# Patient Record
Sex: FEMALE | Race: WHITE | NOT HISPANIC OR LATINO | Employment: FULL TIME | URBAN - METROPOLITAN AREA
[De-identification: names, ages, dates, MRNs, and addresses within clinical notes are randomized per-mention and may not be internally consistent; named-entity substitution may affect disease eponyms.]

---

## 2018-03-08 ENCOUNTER — OFFICE VISIT CONVERTED (OUTPATIENT)
Dept: FAMILY MEDICINE CLINIC | Facility: CLINIC | Age: 58
End: 2018-03-08
Attending: NURSE PRACTITIONER

## 2018-04-02 ENCOUNTER — CONVERSION ENCOUNTER (OUTPATIENT)
Dept: GENERAL RADIOLOGY | Facility: HOSPITAL | Age: 58
End: 2018-04-02

## 2021-05-16 VITALS
HEART RATE: 101 BPM | TEMPERATURE: 97.7 F | HEIGHT: 64 IN | WEIGHT: 178 LBS | DIASTOLIC BLOOD PRESSURE: 74 MMHG | BODY MASS INDEX: 30.39 KG/M2 | SYSTOLIC BLOOD PRESSURE: 132 MMHG

## 2022-10-11 ENCOUNTER — OFFICE VISIT (OUTPATIENT)
Dept: ORTHOPEDIC SURGERY | Facility: CLINIC | Age: 62
End: 2022-10-11

## 2022-10-11 VITALS — TEMPERATURE: 97.4 F | WEIGHT: 177 LBS | HEIGHT: 64 IN | BODY MASS INDEX: 30.22 KG/M2

## 2022-10-11 DIAGNOSIS — M25.552 LEFT HIP PAIN: ICD-10-CM

## 2022-10-11 DIAGNOSIS — M25.551 RIGHT HIP PAIN: ICD-10-CM

## 2022-10-11 DIAGNOSIS — R52 PAIN: Primary | ICD-10-CM

## 2022-10-11 PROCEDURE — 99203 OFFICE O/P NEW LOW 30 MIN: CPT | Performed by: NURSE PRACTITIONER

## 2022-10-11 PROCEDURE — 73522 X-RAY EXAM HIPS BI 3-4 VIEWS: CPT | Performed by: NURSE PRACTITIONER

## 2022-10-11 NOTE — PROGRESS NOTES
"Patient: Carole Mckeon  YOB: 1960 62 y.o. female  Medical Record Number: [id number]    Chief Complaints:   Chief Complaint   Patient presents with   • Left Hip - Initial Evaluation   • Right Hip - Initial Evaluation       History of Present Illness:Carole Mckeon is a 62 y.o. female who presents with complaints of intermittent bilateral hip pain.  Patient had right total hip replacement by Dr. Mcdaniels in 2002 subsequent left total hip replacement by Dr. Moore in 2008.  She has done very well through the last few years but has had some intermittent bilateral hip pain over the last few months.  Denies any injury.  She does have some intermittent low back pain but denies any radicular symptoms.  Patient denies any injury    Allergies: No Known Allergies    Medications:   No current outpatient medications on file.     No current facility-administered medications for this visit.         The following portions of the patient's history were reviewed and updated as appropriate: allergies, current medications, past family history, past medical history, past social history, past surgical history and problem list.    Review of Systems:   A 14 point review of systems was performed. All systems negative except pertinent positives/negative listed in HPI above    Physical Exam:   Vitals:    10/11/22 1513   Temp: 97.4 °F (36.3 °C)   TempSrc: Temporal   Weight: 80.3 kg (177 lb)   Height: 162.6 cm (64.02\")       General: A and O x 3, ASA, NAD    SCLERA:    Normal    Skin clear no unusual lesions noted  Bilateral hips patient is slightly tender over bilateral hip greater trochanteric bursa otherwise has excellent range of motion with no instability calf is soft and nontender       Radiology:  Xrays 2 views bilateral hips were ordered and reviewed today secondary to pain show well-placed well-positioned bilateral total hip replacements, she does have some mild polyethylene wear right hip.  No comparative views " available    Assessment/Plan: Status post bilateral CODEY with intermittent bilateral lateral hip pain    The patient and I discussed options, at this point the soreness is just intermittent and she just wanted to make sure everything was okay with regards to the hip replacement.  Would recommend Tylenol as needed, if her symptoms were to worsen we can always try bilateral hip bursa injections, I did offer work-up for her lower back since she also has some lower back pain but the patient declines at this time.  She will follow-up with Dr. Moore in about a year for her hips but will call in the meantime if her symptoms worsen      Vandana Linares, APRN  10/11/2022

## 2022-10-12 ENCOUNTER — PATIENT ROUNDING (BHMG ONLY) (OUTPATIENT)
Dept: ORTHOPEDIC SURGERY | Facility: CLINIC | Age: 62
End: 2022-10-12

## 2022-10-12 NOTE — PROGRESS NOTES
A [employer] Message has been sent to the patient for PATIENT ROUNDING with [facility]

## 2022-11-11 ENCOUNTER — OFFICE VISIT (OUTPATIENT)
Dept: ORTHOPEDIC SURGERY | Facility: CLINIC | Age: 62
End: 2022-11-11

## 2022-11-11 VITALS — BODY MASS INDEX: 30.22 KG/M2 | TEMPERATURE: 97.6 F | HEIGHT: 64 IN | WEIGHT: 177 LBS

## 2022-11-11 DIAGNOSIS — R52 PAIN: Primary | ICD-10-CM

## 2022-11-11 DIAGNOSIS — M19.019 SHOULDER ARTHRITIS: ICD-10-CM

## 2022-11-11 PROCEDURE — 73030 X-RAY EXAM OF SHOULDER: CPT | Performed by: ORTHOPAEDIC SURGERY

## 2022-11-11 PROCEDURE — 99213 OFFICE O/P EST LOW 20 MIN: CPT | Performed by: ORTHOPAEDIC SURGERY

## 2022-11-11 PROCEDURE — 20610 DRAIN/INJ JOINT/BURSA W/O US: CPT | Performed by: ORTHOPAEDIC SURGERY

## 2022-11-11 RX ORDER — METHYLPREDNISOLONE ACETATE 80 MG/ML
80 INJECTION, SUSPENSION INTRA-ARTICULAR; INTRALESIONAL; INTRAMUSCULAR; SOFT TISSUE
Status: COMPLETED | OUTPATIENT
Start: 2022-11-11 | End: 2022-11-11

## 2022-11-11 RX ADMIN — METHYLPREDNISOLONE ACETATE 80 MG: 80 INJECTION, SUSPENSION INTRA-ARTICULAR; INTRALESIONAL; INTRAMUSCULAR; SOFT TISSUE at 14:21

## 2022-11-11 NOTE — PROGRESS NOTES
Patient: Carole Mckeon    YOB: 1960    Medical Record Number: [id number]    Chief Complaints:  Left shoulder pain    History of Present Illness:     62 y.o. female patient who presents with a complaint of left shoulder pain.  She reports that the symptoms first started a few months ago.  She does not recall any specific inciting event or factor.  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  She says she has to do a lot of lifting with her left shoulder and she thinks that may have contributed in someway.  Her current pain is moderate, constant and aching.  She denies any alleviating factors although she has not yet had any treatment.  She denies any shooting pain down the arm, weakness, numbness or paresthesias.     Allergies: No Known Allergies    Home Medications:  No current outpatient medications on file.    Past Medical History:   Diagnosis Date   • Arthritis of back Oct 22    On X-ray at last visit   • Knee swelling    • Low back strain Nov 1       Past Surgical History:   Procedure Laterality Date   • HIP SURGERY      B THR   • JOINT REPLACEMENT      2002 and 2008       Social History     Occupational History   • Not on file   Tobacco Use   • Smoking status: Never   • Smokeless tobacco: Not on file   Substance and Sexual Activity   • Alcohol use: Never   • Drug use: Never   • Sexual activity: Never      Social History     Social History Narrative   • Not on file       Family History   Problem Relation Age of Onset   • Cancer Mother         Pancreatic   • Osteoporosis Mother        Review of Systems:      Constitutional: Denies fever, shaking or chills   Eyes: Denies change in visual acuity   HEENT: Denies nasal congestion or sore throat   Respiratory: Denies cough or shortness of breath   Cardiovascular: Denies chest pain or edema  Endocrine: Denies tremors, palpitations, intolerance of heat or cold, polyuria, polydipsia.  GI: Denies abdominal pain, nausea, vomiting, bloody stools or  "diarrhea  : Denies frequency, urgency, incontinence, retention, or nocturia.  Musculoskeletal: Denies numbness, tingling or loss of motor function   Integument: Denies rash, lesion or ulceration   Neurologic: Denies headache or focal weakness, deficits  Heme: Denies spontaneous or excessive bleeding, epistaxis, hematuria, melena, fatigue, enlarged or tender lymph nodes.      All other pertinent positives and negatives as noted above in HPI.    Physical Exam:   62 y.o. female  Vitals:    11/11/22 1356   Temp: 97.6 °F (36.4 °C)   Weight: 80.3 kg (177 lb)   Height: 162.6 cm (64\")       General:  Patient is awake and alert.  Appears in no acute distress or discomfort.    Psych:  Affect and demeanor are appropriate.    Cardiovascular:  Regular rate and rhythm.    Lungs:  Good chest expansion.  Breathing unlabored.    Spine:  Neck appears grossly normal.  No palpable masses or adenopathy.  Good motion.  Spurling's maneuver is negative for any shoulder or arm symptoms.    Extremities:  Left shoulder is examined.  Skin is benign.  No obvious gross abnormalities.  No palpable masses or adenopathy.  Moderate tenderness noted over anterior glenohumeral joint and rotator interval.  Motion is to 165 of forward elevation, 60 external rotation, full horizontal abduction, T10 internal rotation.  No instability.  Rotator cuff strength seems to be well preserved but the exam is limited due to discomfort with resisted active motion.  Good motor and sensory function in her lower arm and hand.  Palpable radial pulse.  Brisk capillary refill.         Radiology:  AP, scapular Y, and axillary views of the left shoulder are ordered by myself and reviewed to evaluate the patient's complaint.  No comparison films are immediately available.  The x-rays show mild glenohumeral osteoarthritis with joint space narrowing, osteophyte formation, and subchondral sclerosis.  The acromiohumeral interval measures normal.    Assessment/Plan:  Left " shoulder osteoarthritis    Her motion and function are still really good.  Her x-rays show mild arthritis but it is not profound.  I recommend we try to manage this conservatively. Regarding conservative treatment, we discussed appropriate activity modifications, anti-inflammatories, injections, and physical therapy.  She acknowledged understanding of the information and elected for an injection.  The risk, benefits and alternatives were discussed.  She consented and the injection was performed as described below.  Going forward, she will follow-up with me on an as-needed basis.    Baldo Barros MD    11/11/2022    CC to Chichi Caballero APRN    Large Joint Arthrocentesis: L glenohumeral  Date/Time: 11/11/2022 2:21 PM  Consent given by: patient  Site marked: site marked  Timeout: Immediately prior to procedure a time out was called to verify the correct patient, procedure, equipment, support staff and site/side marked as required   Supporting Documentation  Indications: pain   Procedure Details  Location: shoulder - L glenohumeral  Preparation: Patient was prepped and draped in the usual sterile fashion  Needle gauge: 21G.  Approach: posterior  Medications administered: 80 mg methylPREDNISolone acetate 80 MG/ML; 2 mL lidocaine (cardiac)  Patient tolerance: patient tolerated the procedure well with no immediate complications

## 2022-12-06 ENCOUNTER — TRANSCRIBE ORDERS (OUTPATIENT)
Dept: ADMINISTRATIVE | Facility: HOSPITAL | Age: 62
End: 2022-12-06

## 2022-12-06 ENCOUNTER — HOSPITAL ENCOUNTER (OUTPATIENT)
Dept: INFUSION THERAPY | Facility: HOSPITAL | Age: 62
Discharge: HOME | End: 2022-12-06
Attending: INTERNAL MEDICINE | Admitting: INTERNAL MEDICINE

## 2022-12-06 VITALS
TEMPERATURE: 99.1 F | DIASTOLIC BLOOD PRESSURE: 73 MMHG | OXYGEN SATURATION: 100 % | SYSTOLIC BLOOD PRESSURE: 148 MMHG | RESPIRATION RATE: 18 BRPM | HEART RATE: 86 BPM

## 2022-12-06 DIAGNOSIS — D64.9 ANEMIA, UNSPECIFIED TYPE: Primary | ICD-10-CM

## 2022-12-06 DIAGNOSIS — Z12.31 VISIT FOR SCREENING MAMMOGRAM: Primary | ICD-10-CM

## 2022-12-06 LAB
ABO GROUP BLD: NORMAL
ABO GROUP BLD: NORMAL
BLD GP AB SCN SERPL QL: NEGATIVE
HCT VFR BLD AUTO: 22.4 % (ref 34–46.6)
HGB BLD-MCNC: 6.3 G/DL (ref 12–15.9)
RH BLD: POSITIVE
RH BLD: POSITIVE
T&S EXPIRATION DATE: NORMAL

## 2022-12-06 PROCEDURE — 36430 TRANSFUSION BLD/BLD COMPNT: CPT

## 2022-12-06 PROCEDURE — 86901 BLOOD TYPING SEROLOGIC RH(D): CPT

## 2022-12-06 PROCEDURE — 85018 HEMOGLOBIN: CPT | Performed by: INTERNAL MEDICINE

## 2022-12-06 PROCEDURE — 86900 BLOOD TYPING SEROLOGIC ABO: CPT

## 2022-12-06 PROCEDURE — 86923 COMPATIBILITY TEST ELECTRIC: CPT

## 2022-12-06 PROCEDURE — 86850 RBC ANTIBODY SCREEN: CPT | Performed by: INTERNAL MEDICINE

## 2022-12-06 PROCEDURE — 86901 BLOOD TYPING SEROLOGIC RH(D): CPT | Performed by: INTERNAL MEDICINE

## 2022-12-06 PROCEDURE — 85014 HEMATOCRIT: CPT | Performed by: INTERNAL MEDICINE

## 2022-12-06 PROCEDURE — 86900 BLOOD TYPING SEROLOGIC ABO: CPT | Performed by: INTERNAL MEDICINE

## 2022-12-06 PROCEDURE — P9016 RBC LEUKOCYTES REDUCED: HCPCS

## 2022-12-06 NOTE — NURSING NOTE
AAO. Patient resting comfortably. Second unit PRBC infusing without difficulty. No needs expressed by patient at this time.

## 2022-12-07 ENCOUNTER — TELEPHONE (OUTPATIENT)
Dept: INTERNAL MEDICINE | Facility: CLINIC | Age: 62
End: 2022-12-07

## 2022-12-08 LAB
BH BB BLOOD EXPIRATION DATE: NORMAL
BH BB BLOOD EXPIRATION DATE: NORMAL
BH BB BLOOD TYPE BARCODE: 6200
BH BB BLOOD TYPE BARCODE: 6200
BH BB DISPENSE STATUS: NORMAL
BH BB DISPENSE STATUS: NORMAL
BH BB PRODUCT CODE: NORMAL
BH BB PRODUCT CODE: NORMAL
BH BB UNIT NUMBER: NORMAL
BH BB UNIT NUMBER: NORMAL
CROSSMATCH INTERPRETATION: NORMAL
CROSSMATCH INTERPRETATION: NORMAL
UNIT  ABO: NORMAL
UNIT  ABO: NORMAL
UNIT  RH: NORMAL
UNIT  RH: NORMAL

## 2022-12-15 ENCOUNTER — TELEPHONE (OUTPATIENT)
Dept: GASTROENTEROLOGY | Facility: CLINIC | Age: 62
End: 2022-12-15

## 2022-12-20 ENCOUNTER — TELEPHONE (OUTPATIENT)
Dept: GASTROENTEROLOGY | Facility: CLINIC | Age: 62
End: 2022-12-20

## 2022-12-20 ENCOUNTER — OFFICE VISIT (OUTPATIENT)
Dept: GASTROENTEROLOGY | Facility: CLINIC | Age: 62
End: 2022-12-20

## 2022-12-20 VITALS
SYSTOLIC BLOOD PRESSURE: 142 MMHG | OXYGEN SATURATION: 96 % | TEMPERATURE: 97.9 F | BODY MASS INDEX: 30.73 KG/M2 | HEIGHT: 64 IN | WEIGHT: 180 LBS | DIASTOLIC BLOOD PRESSURE: 79 MMHG | HEART RATE: 98 BPM

## 2022-12-20 DIAGNOSIS — D50.9 IRON DEFICIENCY ANEMIA, UNSPECIFIED IRON DEFICIENCY ANEMIA TYPE: Primary | ICD-10-CM

## 2022-12-20 PROCEDURE — 99204 OFFICE O/P NEW MOD 45 MIN: CPT | Performed by: INTERNAL MEDICINE

## 2022-12-20 RX ORDER — MULTIPLE VITAMINS W/ MINERALS TAB 9MG-400MCG
1 TAB ORAL DAILY
COMMUNITY

## 2022-12-20 NOTE — H&P (VIEW-ONLY)
Chief Complaint   Patient presents with   • Anemia     Carole Mckeon is a 62 y.o. female who presents with a history of iron deficiency anemia  HPI     Patient 62-year-old female with history of hiatal hernia and GERD as well as osteoarthritis presenting with tachycardia seen by primary care noted with marked anemia to hemoglobin 6.3.  Patient's labs also indicated us decreased MCV low iron saturation as well as low ferritin all consistent with iron deficiency.  Patient denies any bright red blood per rectum or melena no hematemesis no change in diet or appetite no change in bowel habits.  Patient referred with only moderate improvement in the tachycardia still symptomatic.  Patient did receive 2 units of blood but no follow-up CBC performed.    Past Medical History:   Diagnosis Date   • Arthritis of back Oct 22    On X-ray at last visit   • GERD (gastroesophageal reflux disease)    • Hernia     Hiatal   • Knee swelling    • Low back strain Nov 1       Current Outpatient Medications:   •  multivitamin with minerals (MULTIVITAMIN WOMEN PO), Take 1 tablet by mouth Daily., Disp: , Rfl:   No Known Allergies  Social History     Socioeconomic History   • Marital status:    Tobacco Use   • Smoking status: Never   Substance and Sexual Activity   • Alcohol use: Never   • Drug use: Never   • Sexual activity: Never     Family History   Problem Relation Age of Onset   • Cancer Mother         Pancreatic   • Osteoporosis Mother    • Pancreatic cancer Mother    • Colon polyps Father    • Colon cancer Maternal Aunt      Review of Systems   Constitutional: Negative.    HENT: Negative.    Eyes: Negative.    Respiratory: Negative.    Cardiovascular: Positive for palpitations. Negative for chest pain and leg swelling.   Gastrointestinal: Negative.    Endocrine: Negative.    Musculoskeletal: Negative.    Skin: Negative.    Allergic/Immunologic: Negative.    Hematological: Negative.      Vitals:    12/20/22 1419   BP: 142/79    Pulse: 98   Temp: 97.9 °F (36.6 °C)   SpO2: 96%     Physical Exam  Vitals and nursing note reviewed.   Constitutional:       Appearance: Normal appearance. She is well-developed.   HENT:      Head: Normocephalic and atraumatic.   Eyes:      General: No scleral icterus.     Pupils: Pupils are equal, round, and reactive to light.   Cardiovascular:      Rate and Rhythm: Normal rate and regular rhythm.      Heart sounds: Normal heart sounds. No murmur heard.    No friction rub. No gallop.   Pulmonary:      Effort: Pulmonary effort is normal.      Breath sounds: Normal breath sounds. No wheezing or rales.   Abdominal:      General: Bowel sounds are normal. There is no distension or abdominal bruit.      Palpations: Abdomen is soft. Abdomen is not rigid. There is no shifting dullness, fluid wave, mass or pulsatile mass.      Tenderness: There is no abdominal tenderness. There is no guarding.      Hernia: No hernia is present.   Musculoskeletal:         General: No swelling or tenderness. Normal range of motion.      Cervical back: Normal range of motion and neck supple. No rigidity.   Skin:     General: Skin is warm and dry.      Coloration: Skin is not jaundiced.      Findings: No rash.   Neurological:      General: No focal deficit present.      Mental Status: She is alert and oriented to person, place, and time.      Cranial Nerves: No cranial nerve deficit.   Psychiatric:         Behavior: Behavior normal.         Thought Content: Thought content normal.       Diagnoses and all orders for this visit:    Iron deficiency anemia, unspecified iron deficiency anemia type  -     Case Request; Standing  -     Implement Anesthesia orders day of procedure.; Standing  -     Obtain informed consent; Standing  -     Case Request    Other orders  -     multivitamin with minerals (MULTIVITAMIN WOMEN PO); Take 1 tablet by mouth Daily.    Patient 62-year-old female with history of osteoarthritis, GERD with a hiatal hernia  presenting with iron deficiency anemia.  Patient presented with symptoms of tachycardia evaluation revealed a hemoglobin of 6.3 with 66 MCV and very low iron saturation and ferritin consistent with iron deficiency.  Patient denies change in bowel habits no change in diet or appetite no bright red blood per rectum or melena denies any nausea or vomiting.  Patient last colonoscopy 2015.  Scope reportedly negative.  Patient with EGD at the same time showing a hiatal hernia.  At this point we will recheck CBC after 2 units transfused to make sure is close enough to safely perform EGD colonoscopy and arrange colonoscopy and EGD this year for source of possible GI blood loss.  We will follow-up clinically based on findings endoscopically.

## 2022-12-20 NOTE — TELEPHONE ENCOUNTER
MITZI patient via telephone for EGD/COLONOSCOPY. Scheduled 12/30/2022 with arrival time of 1215PM. Prep paperwork mailed to verified address on file. Patient advised arrival time may change based on [facility] guidelines. MITZI MADDEN

## 2022-12-21 LAB
ERYTHROCYTE [DISTWIDTH] IN BLOOD BY AUTOMATED COUNT: 20.3 % (ref 12.3–15.4)
HCT VFR BLD AUTO: 30.9 % (ref 34–46.6)
HGB BLD-MCNC: 9.1 G/DL (ref 12–15.9)
MCH RBC QN AUTO: 21.7 PG (ref 26.6–33)
MCHC RBC AUTO-ENTMCNC: 29.4 G/DL (ref 31.5–35.7)
MCV RBC AUTO: 73.7 FL (ref 79–97)
PLATELET # BLD AUTO: 376 10*3/MM3 (ref 140–450)
RBC # BLD AUTO: 4.19 10*6/MM3 (ref 3.77–5.28)
WBC # BLD AUTO: 5.79 10*3/MM3 (ref 3.4–10.8)

## 2022-12-30 ENCOUNTER — ANESTHESIA EVENT (OUTPATIENT)
Dept: GASTROENTEROLOGY | Facility: HOSPITAL | Age: 62
End: 2022-12-30
Payer: COMMERCIAL

## 2022-12-30 ENCOUNTER — ANESTHESIA (OUTPATIENT)
Dept: GASTROENTEROLOGY | Facility: HOSPITAL | Age: 62
End: 2022-12-30
Payer: COMMERCIAL

## 2022-12-30 ENCOUNTER — HOSPITAL ENCOUNTER (OUTPATIENT)
Facility: HOSPITAL | Age: 62
Setting detail: HOSPITAL OUTPATIENT SURGERY
Discharge: HOME | End: 2022-12-30
Attending: INTERNAL MEDICINE | Admitting: INTERNAL MEDICINE
Payer: COMMERCIAL

## 2022-12-30 VITALS
SYSTOLIC BLOOD PRESSURE: 142 MMHG | RESPIRATION RATE: 16 BRPM | DIASTOLIC BLOOD PRESSURE: 76 MMHG | HEART RATE: 78 BPM | TEMPERATURE: 97.8 F | OXYGEN SATURATION: 97 % | BODY MASS INDEX: 29.43 KG/M2 | HEIGHT: 64 IN | WEIGHT: 172.4 LBS

## 2022-12-30 DIAGNOSIS — D50.9 IRON DEFICIENCY ANEMIA, UNSPECIFIED IRON DEFICIENCY ANEMIA TYPE: ICD-10-CM

## 2022-12-30 PROCEDURE — 45378 DIAGNOSTIC COLONOSCOPY: CPT | Performed by: INTERNAL MEDICINE

## 2022-12-30 PROCEDURE — 43239 EGD BIOPSY SINGLE/MULTIPLE: CPT | Performed by: INTERNAL MEDICINE

## 2022-12-30 PROCEDURE — 88305 TISSUE EXAM BY PATHOLOGIST: CPT | Performed by: INTERNAL MEDICINE

## 2022-12-30 PROCEDURE — 25010000002 PROPOFOL 10 MG/ML EMULSION: Performed by: ANESTHESIOLOGY

## 2022-12-30 RX ORDER — SODIUM CHLORIDE 0.9 % (FLUSH) 0.9 %
10 SYRINGE (ML) INJECTION AS NEEDED
Status: DISCONTINUED | OUTPATIENT
Start: 2022-12-30 | End: 2022-12-30 | Stop reason: HOSPADM

## 2022-12-30 RX ORDER — PROPOFOL 10 MG/ML
VIAL (ML) INTRAVENOUS AS NEEDED
Status: DISCONTINUED | OUTPATIENT
Start: 2022-12-30 | End: 2022-12-30 | Stop reason: SURG

## 2022-12-30 RX ORDER — SODIUM CHLORIDE 9 MG/ML
40 INJECTION, SOLUTION INTRAVENOUS AS NEEDED
Status: DISCONTINUED | OUTPATIENT
Start: 2022-12-30 | End: 2022-12-30 | Stop reason: HOSPADM

## 2022-12-30 RX ORDER — SODIUM CHLORIDE 0.9 % (FLUSH) 0.9 %
10 SYRINGE (ML) INJECTION EVERY 12 HOURS SCHEDULED
Status: DISCONTINUED | OUTPATIENT
Start: 2022-12-30 | End: 2022-12-30 | Stop reason: HOSPADM

## 2022-12-30 RX ORDER — SODIUM CHLORIDE, SODIUM LACTATE, POTASSIUM CHLORIDE, CALCIUM CHLORIDE 600; 310; 30; 20 MG/100ML; MG/100ML; MG/100ML; MG/100ML
30 INJECTION, SOLUTION INTRAVENOUS CONTINUOUS PRN
Status: DISCONTINUED | OUTPATIENT
Start: 2022-12-30 | End: 2022-12-30 | Stop reason: HOSPADM

## 2022-12-30 RX ORDER — PANTOPRAZOLE SODIUM 40 MG/1
40 TABLET, DELAYED RELEASE ORAL DAILY
Qty: 90 TABLET | Refills: 3 | Status: SHIPPED | OUTPATIENT
Start: 2022-12-30

## 2022-12-30 RX ORDER — LIDOCAINE HYDROCHLORIDE 20 MG/ML
INJECTION, SOLUTION INFILTRATION; PERINEURAL AS NEEDED
Status: DISCONTINUED | OUTPATIENT
Start: 2022-12-30 | End: 2022-12-30 | Stop reason: SURG

## 2022-12-30 RX ORDER — PANTOPRAZOLE SODIUM 40 MG/1
40 TABLET, DELAYED RELEASE ORAL DAILY
Qty: 90 TABLET | Refills: 3 | Status: SHIPPED | OUTPATIENT
Start: 2022-12-30 | End: 2022-12-30 | Stop reason: SDUPTHER

## 2022-12-30 RX ADMIN — PROPOFOL 200 MCG/KG/MIN: 10 INJECTION, EMULSION INTRAVENOUS at 14:17

## 2022-12-30 RX ADMIN — SODIUM CHLORIDE, POTASSIUM CHLORIDE, SODIUM LACTATE AND CALCIUM CHLORIDE 30 ML/HR: 600; 310; 30; 20 INJECTION, SOLUTION INTRAVENOUS at 12:35

## 2022-12-30 RX ADMIN — LIDOCAINE HYDROCHLORIDE 100 MG: 20 INJECTION, SOLUTION INFILTRATION; PERINEURAL at 14:14

## 2022-12-30 RX ADMIN — PROPOFOL 150 MG: 10 INJECTION, EMULSION INTRAVENOUS at 14:16

## 2022-12-30 NOTE — DISCHARGE INSTRUCTIONS

## 2022-12-30 NOTE — BRIEF OP NOTE
COLONOSCOPY, ESOPHAGOGASTRODUODENOSCOPY  Progress Note    Carole Mckeon  12/30/2022    Pre-op Diagnosis:   Iron deficiency anemia, unspecified iron deficiency anemia type [D50.9]       Post-Op Diagnosis Codes:     * Iron deficiency anemia, unspecified iron deficiency anemia type [D50.9]     * Diverticulosis [K57.90]     * Internal hemorrhoids [K64.8]     * Gastritis [K29.70]     * Hiatal hernia [K44.9]     * Schatzki's ring [K22.2]    Procedure/CPT® Codes:        Procedure(s):  COLONOSCOPY to cecum and TI:  ESOPHAGOGASTRODUODENOSCOPY with biopsies        Surgeon(s):  Sarwat Gaines MD    Anesthesia: Monitored Anesthesia Care    Staff:   Endo Technician: Josh Zimmerman  Endo Nurse: Nasrin Sanchez RN; Yamileth Estrada RN         Estimated Blood Loss: minimal    Urine Voided: * No values recorded between 12/30/2022  2:09 PM and 12/30/2022  2:47 PM *    Specimens:                Specimens     ID Source Type Tests Collected By Collected At Frozen?    A Small Intestine, Duodenum Tissue · TISSUE PATHOLOGY EXAM   Sarwat Gaines MD 12/30/22 1444     B Gastric, Antrum Tissue · TISSUE PATHOLOGY EXAM   Sarwat Gaines MD 12/30/22 1447                 Drains: * No LDAs found *    Findings: Colonoscopy the terminal ileum with normal mucosa.  Diverticulosis in the ascending descending and sigmoid colon noted with internal hemorrhoids seen.  EGD with biopsy performed showing normal duodenum throughout duodenal biopsies taken to rule out celiac.  Gastric mucosa with mild diffuse gastritis biopsies of the antrum obtained to rule out H. pylori.  Retroflexed view the GE junction showed a medium size sliding hiatal hernia with a Schatzki's ring, nonobstructing.  Esophageal mucosa was normal throughout.        Complications: None          Sarwat Gaines MD     Date: 12/30/2022  Time: 14:50 EST

## 2022-12-30 NOTE — ANESTHESIA PREPROCEDURE EVALUATION
Anesthesia Evaluation     Patient summary reviewed and Nursing notes reviewed   history of anesthetic complications: prolonged sedation  NPO Solid Status: > 8 hours  NPO Liquid Status: > 4 hours           Airway   Mallampati: II  Neck ROM: full  No difficulty expected  Dental - normal exam     Pulmonary     breath sounds clear to auscultation  Cardiovascular     Rhythm: regular        Neuro/Psych  GI/Hepatic/Renal/Endo    (+) obesity,  GERD,      Musculoskeletal     Abdominal   (+) obese,    Substance History      OB/GYN          Other   arthritis,                      Anesthesia Plan    ASA 2     MAC     intravenous induction     Anesthetic plan, risks, benefits, and alternatives have been provided, discussed and informed consent has been obtained with: patient.        CODE STATUS:

## 2023-01-02 LAB
LAB AP CASE REPORT: NORMAL
PATH REPORT.FINAL DX SPEC: NORMAL
PATH REPORT.GROSS SPEC: NORMAL

## 2023-01-05 ENCOUNTER — TELEPHONE (OUTPATIENT)
Dept: GASTROENTEROLOGY | Facility: CLINIC | Age: 63
End: 2023-01-05
Payer: COMMERCIAL

## 2023-01-05 NOTE — TELEPHONE ENCOUNTER
----- Message from Sarwat Gaines MD sent at 12/30/2022  2:52 PM EST -----  Please arrange small bowel capsule for patient with iron deficiency anemia negative EGD and colonoscopy

## 2023-01-10 ENCOUNTER — TELEPHONE (OUTPATIENT)
Dept: GASTROENTEROLOGY | Facility: CLINIC | Age: 63
End: 2023-01-10
Payer: COMMERCIAL

## 2023-01-10 NOTE — TELEPHONE ENCOUNTER
----- Message from Sarwat Gaines MD sent at 1/8/2023  9:34 PM EST -----  Labs improved, lisaaivenancio capsule

## 2023-02-09 ENCOUNTER — TELEPHONE (OUTPATIENT)
Dept: GASTROENTEROLOGY | Facility: CLINIC | Age: 63
End: 2023-02-09

## 2023-02-09 NOTE — TELEPHONE ENCOUNTER
Caller: Carole Mckeon    Relationship to patient: Self    Best call back number: [phone]    Patient is needing: PATIENT NEEDING TO SCHEDULE EGD.  PLEASE REACH OUT TO SCHEDULE. THANK YOU

## 2023-02-14 ENCOUNTER — TELEPHONE (OUTPATIENT)
Dept: GASTROENTEROLOGY | Facility: CLINIC | Age: 63
End: 2023-02-14
Payer: COMMERCIAL

## 2023-02-14 NOTE — TELEPHONE ENCOUNTER
----- Message from Sarwat Gaines MD sent at 1/22/2023  7:59 PM EST -----  Biopsies normal, await capsule study

## 2023-03-03 ENCOUNTER — HOSPITAL ENCOUNTER (OUTPATIENT)
Dept: MAMMOGRAPHY | Facility: HOSPITAL | Age: 63
Discharge: HOME | End: 2023-03-03
Admitting: NURSE PRACTITIONER
Payer: COMMERCIAL

## 2023-03-03 DIAGNOSIS — Z12.31 VISIT FOR SCREENING MAMMOGRAM: ICD-10-CM

## 2023-03-03 PROCEDURE — 77067 SCR MAMMO BI INCL CAD: CPT

## 2023-03-03 PROCEDURE — 77063 BREAST TOMOSYNTHESIS BI: CPT

## 2023-03-13 ENCOUNTER — TRANSCRIBE ORDERS (OUTPATIENT)
Dept: ADMINISTRATIVE | Facility: HOSPITAL | Age: 63
End: 2023-03-13
Payer: COMMERCIAL

## 2023-03-13 DIAGNOSIS — R92.8 ABNORMALITY OF LEFT BREAST ON SCREENING MAMMOGRAM: Primary | ICD-10-CM

## 2023-03-16 ENCOUNTER — TELEPHONE (OUTPATIENT)
Dept: GASTROENTEROLOGY | Facility: CLINIC | Age: 63
End: 2023-03-16
Payer: COMMERCIAL

## 2023-03-16 NOTE — TELEPHONE ENCOUNTER
Caller: Carole Mckeon    Relationship to Patient: Self    Best Call Back Number: [phone]    Chief Complaint: Patient called to Schedule Capsule and wanted to check if   a Prior Authorization is need. Patient Request a Call Back

## 2023-03-28 ENCOUNTER — HOSPITAL ENCOUNTER (OUTPATIENT)
Dept: MAMMOGRAPHY | Facility: HOSPITAL | Age: 63
Discharge: HOME | End: 2023-03-28
Payer: COMMERCIAL

## 2023-03-28 ENCOUNTER — HOSPITAL ENCOUNTER (OUTPATIENT)
Dept: ULTRASOUND IMAGING | Facility: HOSPITAL | Age: 63
Discharge: HOME | End: 2023-03-28
Payer: COMMERCIAL

## 2023-03-28 DIAGNOSIS — R92.8 ABNORMALITY OF LEFT BREAST ON SCREENING MAMMOGRAM: ICD-10-CM

## 2023-03-28 PROCEDURE — G0279 TOMOSYNTHESIS, MAMMO: HCPCS

## 2023-03-28 PROCEDURE — 77065 DX MAMMO INCL CAD UNI: CPT

## 2023-04-04 ENCOUNTER — TRANSCRIBE ORDERS (OUTPATIENT)
Dept: ADMINISTRATIVE | Facility: HOSPITAL | Age: 63
End: 2023-04-04
Payer: COMMERCIAL

## 2023-04-04 DIAGNOSIS — R92.8 ABNORMAL MAMMOGRAM: Primary | ICD-10-CM

## 2023-05-24 ENCOUNTER — TELEPHONE (OUTPATIENT)
Dept: GASTROENTEROLOGY | Facility: CLINIC | Age: 63
End: 2023-05-24

## 2023-05-24 NOTE — TELEPHONE ENCOUNTER
Caller: BARB VARGAS     Relationship to patient: SELF    Best call back number: [phone]    Patient is needing: PATIENT CALLED IN, SHE REACHED OUT BACK IN MARCH TO HAVE HER CAPSULE ENDOSCOPY SCHEDULED AND HASN'T RECEIVED A CALL BACK. PLEASE CALL PATIENT TO SCHEDULE THIS, YOU CAN LEAVE A VOICEMAIL IF YOU NEED TO.

## 2023-08-01 ENCOUNTER — TELEPHONE (OUTPATIENT)
Dept: GASTROENTEROLOGY | Facility: CLINIC | Age: 63
End: 2023-08-01
Payer: COMMERCIAL

## 2023-08-01 NOTE — TELEPHONE ENCOUNTER
SILVIA WITH DR KALEY TORRES'S OFFICE CALLED AND IS CHECKING TO SEE IF THE PT'S CAPSULE STUDY HAS BEEN SCHEDULED.  IT HAS NOT.  WILL YOU PLEASE CHECK ON THIS AND CALL THE PT.

## 2023-08-17 NOTE — TELEPHONE ENCOUNTER
Hub staff attempted to follow warm transfer process and was unsuccessful     Caller: BARB VARGAS     Relationship to patient: SELF    Best call back number: [phone]    Patient is needing: PT CALLED TO SEE IF THE PILL CAPSULE TEST HAS BEEN ORDERED. PLEASE CALL AND UPDATE PT OR SCHEDULE.

## 2023-08-22 NOTE — TELEPHONE ENCOUNTER
Hub staff attempted to follow warm transfer process and was unsuccessful     Caller: Carole Mckeon    Relationship to patient: Self    Best call back number: [phone]     Patient is needing: PT CALLING TO CHECK IF CAPSULE STUDY HAS BEEN ORDERED AND TO SCHEDULE CAPSULE STUDY. HAS NOT HEARD ANY UPDATES SINCE 8/17/23. PT HAS BEEN TRYING TO GET SCHEDULED SINCE MARCH. CALL BACK ANYTIME OK TO [facility].

## 2023-08-27 ENCOUNTER — HOSPITAL ENCOUNTER (EMERGENCY)
Facility: HOSPITAL | Age: 63
Discharge: HOME | End: 2023-08-27
Attending: EMERGENCY MEDICINE | Admitting: EMERGENCY MEDICINE
Payer: COMMERCIAL

## 2023-08-27 ENCOUNTER — APPOINTMENT (OUTPATIENT)
Dept: GENERAL RADIOLOGY | Facility: HOSPITAL | Age: 63
End: 2023-08-27
Payer: COMMERCIAL

## 2023-08-27 ENCOUNTER — APPOINTMENT (OUTPATIENT)
Dept: CT IMAGING | Facility: HOSPITAL | Age: 63
End: 2023-08-27
Payer: COMMERCIAL

## 2023-08-27 VITALS
WEIGHT: 169.31 LBS | HEART RATE: 92 BPM | DIASTOLIC BLOOD PRESSURE: 70 MMHG | SYSTOLIC BLOOD PRESSURE: 119 MMHG | TEMPERATURE: 97.8 F | BODY MASS INDEX: 28.91 KG/M2 | HEIGHT: 64 IN | OXYGEN SATURATION: 95 % | RESPIRATION RATE: 16 BRPM

## 2023-08-27 DIAGNOSIS — K44.9 HIATAL HERNIA WITH GERD AND ESOPHAGITIS: Primary | ICD-10-CM

## 2023-08-27 DIAGNOSIS — K21.00 HIATAL HERNIA WITH GERD AND ESOPHAGITIS: Primary | ICD-10-CM

## 2023-08-27 LAB
ALBUMIN SERPL-MCNC: 4.2 G/DL (ref 3.5–5.2)
ALBUMIN/GLOB SERPL: 1.6 G/DL
ALP SERPL-CCNC: 101 U/L (ref 39–117)
ALT SERPL W P-5'-P-CCNC: 52 U/L (ref 1–33)
ANION GAP SERPL CALCULATED.3IONS-SCNC: 12.2 MMOL/L (ref 5–15)
AST SERPL-CCNC: 60 U/L (ref 1–32)
BASOPHILS # BLD AUTO: 0.07 10*3/MM3 (ref 0–0.2)
BASOPHILS NFR BLD AUTO: 1.1 % (ref 0–1.5)
BILIRUB SERPL-MCNC: 0.9 MG/DL (ref 0–1.2)
BUN SERPL-MCNC: 12 MG/DL (ref 8–23)
BUN/CREAT SERPL: 12 (ref 7–25)
CALCIUM SPEC-SCNC: 10.8 MG/DL (ref 8.6–10.5)
CHLORIDE SERPL-SCNC: 102 MMOL/L (ref 98–107)
CO2 SERPL-SCNC: 21.8 MMOL/L (ref 22–29)
CREAT SERPL-MCNC: 1 MG/DL (ref 0.57–1)
DEPRECATED RDW RBC AUTO: 39.3 FL (ref 37–54)
EGFRCR SERPLBLD CKD-EPI 2021: 63.4 ML/MIN/1.73
EOSINOPHIL # BLD AUTO: 0.19 10*3/MM3 (ref 0–0.4)
EOSINOPHIL NFR BLD AUTO: 3 % (ref 0.3–6.2)
ERYTHROCYTE [DISTWIDTH] IN BLOOD BY AUTOMATED COUNT: 12.3 % (ref 12.3–15.4)
GEN 5 2HR TROPONIN T REFLEX: 14 NG/L
GLOBULIN UR ELPH-MCNC: 2.7 GM/DL
GLUCOSE SERPL-MCNC: 212 MG/DL (ref 65–99)
HCT VFR BLD AUTO: 43.8 % (ref 34–46.6)
HGB BLD-MCNC: 15.6 G/DL (ref 12–15.9)
HOLD SPECIMEN: NORMAL
HOLD SPECIMEN: NORMAL
IMM GRANULOCYTES # BLD AUTO: 0.03 10*3/MM3 (ref 0–0.05)
IMM GRANULOCYTES NFR BLD AUTO: 0.5 % (ref 0–0.5)
LIPASE SERPL-CCNC: 49 U/L (ref 13–60)
LYMPHOCYTES # BLD AUTO: 1.2 10*3/MM3 (ref 0.7–3.1)
LYMPHOCYTES NFR BLD AUTO: 18.8 % (ref 19.6–45.3)
MAGNESIUM SERPL-MCNC: 1.9 MG/DL (ref 1.6–2.4)
MCH RBC QN AUTO: 31 PG (ref 26.6–33)
MCHC RBC AUTO-ENTMCNC: 35.6 G/DL (ref 31.5–35.7)
MCV RBC AUTO: 87.1 FL (ref 79–97)
MONOCYTES # BLD AUTO: 0.49 10*3/MM3 (ref 0.1–0.9)
MONOCYTES NFR BLD AUTO: 7.7 % (ref 5–12)
NEUTROPHILS NFR BLD AUTO: 4.39 10*3/MM3 (ref 1.7–7)
NEUTROPHILS NFR BLD AUTO: 68.9 % (ref 42.7–76)
NRBC BLD AUTO-RTO: 0 /100 WBC (ref 0–0.2)
NT-PROBNP SERPL-MCNC: 67.7 PG/ML (ref 0–900)
PLATELET # BLD AUTO: 266 10*3/MM3 (ref 140–450)
PMV BLD AUTO: 10.3 FL (ref 6–12)
POTASSIUM SERPL-SCNC: 3.8 MMOL/L (ref 3.5–5.2)
PROT SERPL-MCNC: 6.9 G/DL (ref 6–8.5)
RBC # BLD AUTO: 5.03 10*6/MM3 (ref 3.77–5.28)
SARS-COV-2 RNA RESP QL NAA+PROBE: NOT DETECTED
SODIUM SERPL-SCNC: 136 MMOL/L (ref 136–145)
TROPONIN T DELTA: 2 NG/L
TROPONIN T SERPL HS-MCNC: 12 NG/L
WBC NRBC COR # BLD: 6.37 10*3/MM3 (ref 3.4–10.8)
WHOLE BLOOD HOLD COAG: NORMAL
WHOLE BLOOD HOLD SPECIMEN: NORMAL

## 2023-08-27 PROCEDURE — 36415 COLL VENOUS BLD VENIPUNCTURE: CPT

## 2023-08-27 PROCEDURE — 93005 ELECTROCARDIOGRAM TRACING: CPT | Performed by: EMERGENCY MEDICINE

## 2023-08-27 PROCEDURE — 83880 ASSAY OF NATRIURETIC PEPTIDE: CPT

## 2023-08-27 PROCEDURE — 71260 CT THORAX DX C+: CPT

## 2023-08-27 PROCEDURE — 83690 ASSAY OF LIPASE: CPT

## 2023-08-27 PROCEDURE — 85025 COMPLETE CBC W/AUTO DIFF WBC: CPT

## 2023-08-27 PROCEDURE — 71045 X-RAY EXAM CHEST 1 VIEW: CPT

## 2023-08-27 PROCEDURE — 87635 SARS-COV-2 COVID-19 AMP PRB: CPT

## 2023-08-27 PROCEDURE — 84484 ASSAY OF TROPONIN QUANT: CPT | Performed by: EMERGENCY MEDICINE

## 2023-08-27 PROCEDURE — 99285 EMERGENCY DEPT VISIT HI MDM: CPT

## 2023-08-27 PROCEDURE — 80053 COMPREHEN METABOLIC PANEL: CPT

## 2023-08-27 PROCEDURE — 84484 ASSAY OF TROPONIN QUANT: CPT

## 2023-08-27 PROCEDURE — 83735 ASSAY OF MAGNESIUM: CPT

## 2023-08-27 PROCEDURE — 25510000001 IOPAMIDOL PER 1 ML: Performed by: EMERGENCY MEDICINE

## 2023-08-27 PROCEDURE — 93005 ELECTROCARDIOGRAM TRACING: CPT

## 2023-08-27 RX ORDER — ASPIRIN 81 MG/1
324 TABLET, CHEWABLE ORAL ONCE
Status: COMPLETED | OUTPATIENT
Start: 2023-08-27 | End: 2023-08-27

## 2023-08-27 RX ORDER — SODIUM CHLORIDE 0.9 % (FLUSH) 0.9 %
10 SYRINGE (ML) INJECTION AS NEEDED
Status: DISCONTINUED | OUTPATIENT
Start: 2023-08-27 | End: 2023-08-27 | Stop reason: HOSPADM

## 2023-08-27 RX ADMIN — ASPIRIN 324 MG: 81 TABLET, CHEWABLE ORAL at 08:16

## 2023-08-27 RX ADMIN — IOPAMIDOL 100 ML: 755 INJECTION, SOLUTION INTRAVENOUS at 11:51

## 2023-08-27 NOTE — DISCHARGE INSTRUCTIONS
Your CT scan shows that you just have a large hiatal hernia (stomach pushing up into the chest area) which is causing him a small amount of collapse in the lower parts of your lungs, but otherwise nothing serious.    Hiatal hernias are associated with a lot of acid reflux and indigestion and you may have had some of this causing your chest discomfort and radiating pain to the left shoulder.    No actual heart attack was seen in both sets your heart enzymes checked out.    However, given some risk factors and somewhat abnormal EKG on arrival you should follow-up with either your primary care doctor or a cardiology office to arrange for cardiac stress testing for further evaluation.

## 2023-08-27 NOTE — ED PROVIDER NOTES
Time: 10:29 AM EDT  Date of encounter:  8/27/2023  Independent Historian/Clinical History and Information was obtained by:   Patient    History is limited by: N/A    Chief Complaint   Patient presents with    Chest Pain         History of Present Illness:  Patient is a 63 y.o. year old female who presents to the emergency department for evaluation of chest pain described as something sitting on chest. Started last night, has improved some. No cardiac HX. Pt's daughter recently had covid. No real SOA but states she feels like she cannot get a deep breath because something is sitting on her chest.     She also had some left shoulder pain but has arthritis in the shoulder so unsure if this is a new issue.    She denies any previous known history of coronary artery disease.      HPI    Patient Care Team  Primary Care Provider: Chichi Caballero APRN    Past Medical History:     No Known Allergies  Past Medical History:   Diagnosis Date    Arthritis of back Oct 22    On X-ray at last visit    GERD (gastroesophageal reflux disease)     Hernia     Hiatal    Knee swelling     Low back strain Nov 1    Slow to wake up after anesthesia      Past Surgical History:   Procedure Laterality Date    APPENDECTOMY  1960’s    COLONOSCOPY  2015    negative per pt    COLONOSCOPY N/A 12/30/2022    Procedure: COLONOSCOPY to cecum and TI:;  Surgeon: Sarwat Gaines MD;  Location: [facility] ENDOSCOPY;  Service: Gastroenterology;  Laterality: N/A;  pre:  iron deficiency anemia  post:  diverticulosis, TI normal, hemorrhoids      ENDOSCOPY N/A 12/30/2022    Procedure: ESOPHAGOGASTRODUODENOSCOPY with biopsies;  Surgeon: Sarwat Gaines MD;  Location: [facility] ENDOSCOPY;  Service: Gastroenterology;  Laterality: N/A;  pre;   iron deficiency anemia  post:  schatzkis ring, hiatal hernia    HIP SURGERY      B THR    JOINT REPLACEMENT      2002 and 2008    LAPAROSCOPIC CHOLECYSTECTOMY      UPPER GASTROINTESTINAL ENDOSCOPY  2015    hiatal hernia per  "pt     Family History   Problem Relation Age of Onset    Cancer Mother         Pancreatic    Osteoporosis Mother     Pancreatic cancer Mother     Colon polyps Father     Colon cancer Maternal Aunt        Home Medications:  Prior to Admission medications    Medication Sig Start Date End Date Taking? Authorizing Provider   multivitamin with minerals tablet tablet Take 1 tablet by mouth Daily.    Provider, MD Margarito   pantoprazole (PROTONIX) 40 MG EC tablet Take 1 tablet by mouth Daily. 12/30/22   Sarwat Gaines MD        Social History:   Social History     Tobacco Use    Smoking status: Never   Substance Use Topics    Alcohol use: Yes     Comment: rarely         Review of Systems:  Review of Systems   I performed a 10 point review of systems which was all negative, except for the positives found in the HPI above.  Physical Exam:  /70   Pulse 92   Temp 97.8 °F (36.6 °C) (Oral)   Resp 16   Ht 162.6 cm (64\")   Wt 76.8 kg (169 lb 5 oz)   SpO2 95%   BMI 29.06 kg/m²         Physical Exam   General: Awake alert and in no obvious distress, smiling    HEENT: Head normocephalic atraumatic, eyes PERRLA EOMI, nose normal, oropharynx normal.    Neck: Supple full range of motion, no meningismus, no lymphadenopathy    Chest wall exam: No reproducible tenderness to palpation    Heart: Regular rate and rhythm, no murmurs or rubs, 2+ radial pulses bilaterally    Lungs: Clear to auscultation bilaterally without wheezes or crackles, no respiratory distress    Abdomen: Soft, nontender, nondistended, no rebound or guarding    Skin: Warm, dry, no rash    Musculoskeletal: Normal range of motion, no lower extremity edema or calf tenderness bilaterally    Neurologic: Oriented x3, no motor deficits no sensory deficits    Psychiatric: Mood appears stable, no psychosis              Procedures:  Procedures      Medical Decision Making:      Comorbidities that affect care:    History of GERD    External Notes " reviewed:    None      The following orders were placed and all results were independently analyzed by me:  Orders Placed This Encounter   Procedures    COVID PRE-OP / PRE-PROCEDURE SCREENING ORDER (NO ISOLATION) - Swab, Nasopharynx    COVID-19,CEPHEID/KIMBERLY,COR/JENNIFER/PAD/DHRUV/MAD IN-HOUSE(OR EMERGENT/ADD-ON),NP SWAB IN TRANSPORT MEDIA 3-4 HR TAT, RT-PCR - Swab, Nasopharynx    XR Chest 1 View    CT Chest With Contrast Diagnostic    [place] Draw    High Sensitivity Troponin T    Comprehensive Metabolic Panel    Lipase    BNP    Magnesium    CBC Auto Differential    High Sensitivity Troponin T 2Hr    Undress & Gown    Continuous Pulse Oximetry    ECG 12 Lead ED Triage Standing Order; Chest Pain    CBC & Differential    Green Top (Gel)    Lavender Top    Gold Top - SST    Light Blue Top       Medications Given in the Emergency Department:  Medications   aspirin chewable tablet 324 mg (324 mg Oral Given 8/27/23 0816)   iopamidol (ISOVUE-370) 76 % injection 100 mL (100 mL Intravenous Given 8/27/23 1151)        ED Course:    The patient was initially evaluated in the triage area where orders were placed. The patient was later dispositioned by Michael Garnica MD.      The patient was advised to stay for completion of workup which includes but is not limited to communication of labs and radiological results, reassessment and plan. The patient was advised that leaving prior to disposition by a provider could result in critical findings that are not communicated to the patient.     ED Course as of 08/27/23 1621   Sun Aug 27, 2023   1029 --- RED ROUNDING PROVIDER ---  Patient was rounded on by MI craven, JUDE. Orders were written and the patient is currently awaiting disposition.   [MS]   1034 Pt updated with XR results - CT ordered per radiologist recommendation. [MS]   1147 This patient EKG interpreted by myself as sinus tachycardia 116 bpm, normal P waves, QRS showing ST depressions throughout the anterolateral  leads but no ST elevation, normal T waves.  Possible ischemia noted. [VS]      ED Course User Index  [MS] Yaneth Cannon APRN  [VS] Michael Garnica MD       Labs:    Lab Results (last 24 hours)       Procedure Component Value Units Date/Time    Lipase [[id number]]  (Normal) Collected: 08/27/23 0825    Specimen: Blood Updated: 08/27/23 0858     Lipase 49 U/L     BNP [[id number]]  (Normal) Collected: 08/27/23 0825    Specimen: Blood Updated: 08/27/23 0856     proBNP 67.7 pg/mL     Narrative:      Among patients with dyspnea, NT-proBNP is highly sensitive for the detection of acute congestive heart failure. In addition NT-proBNP of <300 pg/ml effectively rules out acute congestive heart failure with 99% negative predictive value.      CBC & Differential [[id number]]  (Abnormal) Collected: 08/27/23 0843    Specimen: Blood Updated: 08/27/23 0851    Narrative:      The following orders were created for panel order CBC & Differential.  Procedure                               Abnormality         Status                     ---------                               -----------         ------                     CBC Auto Differential[[id number]]        Abnormal            Final result                 Please view results for these tests on the individual orders.    CBC Auto Differential [[id number]]  (Abnormal) Collected: 08/27/23 0843    Specimen: Blood Updated: 08/27/23 0851     WBC 6.37 10*3/mm3      RBC 5.03 10*6/mm3      Hemoglobin 15.6 g/dL      Hematocrit 43.8 %      MCV 87.1 fL      MCH 31.0 pg      MCHC 35.6 g/dL      RDW 12.3 %      RDW-SD 39.3 fl      MPV 10.3 fL      Platelets 266 10*3/mm3      Neutrophil % 68.9 %      Lymphocyte % 18.8 %      Monocyte % 7.7 %      Eosinophil % 3.0 %      Basophil % 1.1 %      Immature Grans % 0.5 %      Neutrophils, Absolute 4.39 10*3/mm3      Lymphocytes, Absolute 1.20 10*3/mm3      Monocytes, Absolute 0.49 10*3/mm3      Eosinophils, Absolute 0.19 10*3/mm3      Basophils,  Absolute 0.07 10*3/mm3      Immature Grans, Absolute 0.03 10*3/mm3      nRBC 0.0 /100 WBC     High Sensitivity Troponin T [[id number]]  (Abnormal) Collected: 08/27/23 0918    Specimen: Blood Updated: 08/27/23 0952     HS Troponin T 12 ng/L     Narrative:      High Sensitive Troponin T Reference Range:  <10.0 ng/L- Negative Female for AMI  <15.0 ng/L- Negative Male for AMI  >=10 - Abnormal Female indicating possible myocardial injury.  >=15 - Abnormal Male indicating possible myocardial injury.   Clinicians would have to utilize clinical acumen, EKG, Troponin, and serial changes to determine if it is an Acute Myocardial Infarction or myocardial injury due to an underlying chronic condition.         Comprehensive Metabolic Panel [[id number]]  (Abnormal) Collected: 08/27/23 0918    Specimen: Blood Updated: 08/27/23 0952     Glucose 212 mg/dL      BUN 12 mg/dL      Creatinine 1.00 mg/dL      Sodium 136 mmol/L      Potassium 3.8 mmol/L      Chloride 102 mmol/L      CO2 21.8 mmol/L      Calcium 10.8 mg/dL      Total Protein 6.9 g/dL      Albumin 4.2 g/dL      ALT (SGPT) 52 U/L      AST (SGOT) 60 U/L      Alkaline Phosphatase 101 U/L      Total Bilirubin 0.9 mg/dL      Globulin 2.7 gm/dL      A/G Ratio 1.6 g/dL      BUN/Creatinine Ratio 12.0     Anion Gap 12.2 mmol/L      eGFR 63.4 mL/min/1.73     Narrative:      GFR Normal >60  Chronic Kidney Disease <60  Kidney Failure <15      Magnesium [[id number]]  (Normal) Collected: 08/27/23 0918    Specimen: Blood Updated: 08/27/23 0952     Magnesium 1.9 mg/dL     COVID PRE-OP / PRE-PROCEDURE SCREENING ORDER (NO ISOLATION) - Swab, Nasopharynx [[id number]]  (Normal) Collected: 08/27/23 1059    Specimen: Swab from Nasopharynx Updated: 08/27/23 1240    Narrative:      The following orders were created for panel order COVID PRE-OP / PRE-PROCEDURE SCREENING ORDER (NO ISOLATION) - Swab, Nasopharynx.  Procedure                               Abnormality         Status                      ---------                               -----------         ------                     COVID-19,CEPHEID/KIMBERLY,CO...[[id number]]  Normal              Final result                 Please view results for these tests on the individual orders.    COVID-19,CEPHEID/KIMBERLY,COR/JENNIFER/PAD/DHRUV/MAD IN-HOUSE(OR EMERGENT/ADD-ON),NP SWAB IN TRANSPORT MEDIA 3-4 HR TAT, RT-PCR - Swab, Nasopharynx [[id number]]  (Normal) Collected: 08/27/23 1059    Specimen: Swab from Nasopharynx Updated: 08/27/23 1240     COVID19 Not Detected    Narrative:      Fact sheet for providers: https://www.fda.gov/media/115384/download     Fact sheet for patients: https://www.fda.gov/media/402831/download  Fact sheet for providers: https://www.fda.gov/media/352061/download     Fact sheet for patients: https://www.fda.gov/media/504760/download    High Sensitivity Troponin T 2Hr [[id number]]  (Abnormal) Collected: 08/27/23 1233    Specimen: Blood Updated: 08/27/23 1258     HS Troponin T 14 ng/L      Troponin T Delta 2 ng/L     Narrative:      High Sensitive Troponin T Reference Range:  <10.0 ng/L- Negative Female for AMI  <15.0 ng/L- Negative Male for AMI  >=10 - Abnormal Female indicating possible myocardial injury.  >=15 - Abnormal Male indicating possible myocardial injury.   Clinicians would have to utilize clinical acumen, EKG, Troponin, and serial changes to determine if it is an Acute Myocardial Infarction or myocardial injury due to an underlying chronic condition.                  Imaging:    CT Chest With Contrast Diagnostic    Result Date: 8/27/2023  PROCEDURE: CT CHEST W CONTRAST DIAGNOSTIC  COMPARISON:  [facility], CT, ABDOMEN/PELVIS WITH CONTRAST, 5/19/2016, 7:34.  [facility], CR, CHEST AP/PA 1 VIEW, 12/05/2016, 7:51.  [facility], CR, CHEST AP/PA 1 VIEW, 12/05/2016, 18:14.  [facility], CT, ABD PEL W/O CONTRAST, 12/05/2016, 10:18.  [facility], CR, XR CHEST 1 VW, 8/27/2023,  8:11. INDICATIONS: CP  TECHNIQUE: After obtaining the patient's consent, CT images were obtained with non-ionic intravenous contrast material.   PROTOCOL:   Pulmonary embolism imaging protocol performed    RADIATION:   DLP: 368.9 mGy*cm   Automated exposure control was utilized to minimize radiation dose. CONTRAST: 75 cc Isovue 370 I.V. LABS:   eGFR: >60 ml/min/1.73m2  FINDINGS:  The central pulmonary arteries appear to enhance as expected.  Contrast bolus timing somewhat limits assessment of distal segmental and subsegmental pulmonary arteries.  Streak artifact from contrast in the SVC somewhat limits assessment of upper lobe pulmonary arteries.  No definitive pulmonary embolism is seen.  There is a large hiatal hernia.  There is questionable diffuse esophageal wall thickening.  There is a 10 mm hypodense lesion in the left lobe of thyroid.  No definite thoracic lymphadenopathy.  Heart size appears within normal limits.  No pericardial effusion.  There is calcific atherosclerosis of the coronary arteries.  No significant pericardial effusion.  No definite acute aortic abnormality is identified.  No pneumothorax or effusion.  There is some mild consolidation in the medial lower lobes adjacent to the hiatal hernia.  The central airways appear patent.  No other significant pulmonary infiltrate is identified.  No aggressive osseous lesion is seen.  There are degenerative changes in the thoracic spine.  There is hypoattenuation of the liver relative to the spleen suggesting fatty infiltration.  No other acute abnormality is seen in the visualized upper abdomen.        1. No definite findings of acute pulmonary embolism.  Contrast bolus timing somewhat limits assessment of distal pulmonary arteries. 2. Large hiatal hernia with mild adjacent consolidation in the lower lobes, likely atelectasis.  This may account for the appearance on chest radiograph.  No other significant pulmonary infiltrate is seen at this time. 3.  Questionable wall thickening of the esophagus which could be associated with esophagitis.  Consider follow-up with endoscopy. 4. Fatty infiltration of the liver. 5. 1 cm hypodensity in the left lobe of thyroid.  Consider follow-up thyroid ultrasound.     TAMMI JEAN MD       Electronically Signed and Approved By: TAMMI JEAN MD on 8/27/2023 at 12:29             XR Chest 1 View    Result Date: 8/27/2023  PROCEDURE: XR CHEST 1 VW  COMPARISON: [facility], CT, ABD PEL W/O CONTRAST, 12/05/2016, 10:18.  [facility], CR, CHEST AP/PA 1 VIEW, 12/05/2016, 18:14.  INDICATIONS: Chest Pain Triage Protocol/ heaviness in chest  FINDINGS:  Right diaphragm is elevated there is volume loss in the right chest with of a complete right lower lobe atelectasis.  There is cut off of the right lower lobe bronchus.  The aerated portions of the lungs are grossly clear.  Pulmonary vascularity and heart size are normal.  Retrocardiac density is present possibly a hiatal hernia.  No pneumothorax or pleural effusion.       Complete right lower lobe atelectasis .  Chest CT with contrast is recommended.       BETSEY ALCOCER DO       Electronically Signed and Approved By: BETSEY ALCOCER DO on 8/27/2023 at 8:23                Differential Diagnosis and Discussion:      Chest Pain:  Based on the patient's signs and symptoms, I considered aortic dissection, myocardial infaction, pulmonary embolism, cardiac tamponade, pericarditis, pneumothorax, musculoskeletal chest pain and other differential diagnosis as an etiology of the patient's chest pain.     All labs were reviewed and interpreted by me.  All X-rays impressions were independently interpreted by me.  EKG was interpreted by me.  CT scan radiology impression was interpreted by me.    MDM     Amount and/or Complexity of Data Reviewed  Clinical lab tests: reviewed  Tests in the radiology section of CPT®: reviewed  Tests in the medicine section of CPT®: reviewed              This patient is a 63-year-old female presenting with chest discomfort rating to the left shoulder starting last night.    Her initial EKG shows some mild ST depressions throughout the anterolateral leads concerning for ischemia but initial troponin is negative.    I repeated a second EKG a couple hours later and it actually looks normal now and the ST depressions have resolved.    She had abnormal chest x-ray concerning for right lower lobe atelectasis, and a CT of the chest was recommended by radiology so we ordered this as well.      CT of the chest came back essentially to showing a large hiatal hernia which patient was already aware of and this was causing some lung base abnormalities on the x-ray.    Patient is feeling better and chest pain resolved and both troponins negative.    I do consider she probably has more of an episode of GERD or esophagitis exacerbation from her large hiatal hernia causing her symptoms than anything else.    However, given her risk factors and abnormal EKG despite negative troponins I will ask her to follow-up closely for stress testing with her cardiologist.                Patient Care Considerations:          Consultants/Shared Management Plan:        Social Determinants of Health:    Patient is independent, reliable, and has access to care.       Disposition and Care Coordination:    Discharged: I considered escalation of care by admitting this patient to the hospital, however the patient has improved and is suitable and stable for discharge.    I have explained the patient´s condition, diagnoses and treatment plan based on the information available to me at this time. I have answered questions and addressed any concerns. The patient has a good  understanding of the patient´s diagnosis, condition, and treatment plan as can be expected at this point. The vital signs have been stable. The patient´s condition is stable and appropriate for discharge from the emergency  department.      The patient will pursue further outpatient evaluation with the primary care physician or other designated or consulting physician as outlined in the discharge instructions. They are agreeable to this plan of care and follow-up instructions have been explained in detail. The patient has received these instructions in written format and have expressed an understanding of the discharge instructions. The patient is aware that any significant change in condition or worsening of symptoms should prompt an immediate return to this or the closest emergency department or call to 911.  I have explained discharge medications and the need for follow up with the patient/caretakers. This was also printed in the discharge instructions. Patient was discharged with the following medications and follow up:      Medication List      No changes were made to your prescriptions during this visit.      Mikki Will MD  [address] [place] [id number] [phone]    Call in 1 day  for a follow-up appointment to arrange for cardiac stress testing       Final diagnoses:   Hiatal hernia with GERD and esophagitis        ED Disposition       ED Disposition   Discharge    Condition   Stable    Comment   --               This medical record created using voice recognition software.             Michael Garnica MD  08/27/23 4688

## 2023-08-30 NOTE — TELEPHONE ENCOUNTER
CALLED PT NO ANSWER LM ON VM REGARDING WAIT FOR CAPSULE EQUIPMENT AND THAT WE WOUULD BE CALLING TO SCHEDULE ONCE IT IS RECEIVED

## 2023-08-31 LAB
QT INTERVAL: 338 MS
QT INTERVAL: 385 MS
QTC INTERVAL: 467 MS
QTC INTERVAL: 471 MS

## 2023-09-01 ENCOUNTER — OFFICE VISIT (OUTPATIENT)
Dept: CARDIOLOGY | Facility: CLINIC | Age: 63
End: 2023-09-01
Payer: COMMERCIAL

## 2023-09-01 VITALS
BODY MASS INDEX: 30.22 KG/M2 | DIASTOLIC BLOOD PRESSURE: 84 MMHG | HEART RATE: 65 BPM | HEIGHT: 64 IN | SYSTOLIC BLOOD PRESSURE: 153 MMHG | WEIGHT: 177 LBS

## 2023-09-01 DIAGNOSIS — R07.89 CHEST HEAVINESS: Primary | ICD-10-CM

## 2023-09-01 DIAGNOSIS — I10 ESSENTIAL HYPERTENSION: ICD-10-CM

## 2023-09-01 PROCEDURE — 99204 OFFICE O/P NEW MOD 45 MIN: CPT | Performed by: INTERNAL MEDICINE

## 2023-09-01 RX ORDER — FERROUS SULFATE 325(65) MG
TABLET ORAL
COMMUNITY
Start: 2023-06-14

## 2023-09-01 RX ORDER — AMLODIPINE BESYLATE 5 MG/1
5 TABLET ORAL DAILY
Qty: 90 TABLET | Refills: 3 | Status: SHIPPED | OUTPATIENT
Start: 2023-09-01

## 2023-09-01 NOTE — PROGRESS NOTES
"Chief Complaint  Hypertension and Chest Pain      History of Present Illness  Carole Mckeon presents to [facility] CARDIOLOGY    This is a very pleasant 60-year-old lady with past medical history as outlined below presents to clinic for cardiac evaluation.  She has been having sporadic episodes of anterior chest discomfort which feels like heaviness.  It varies in duration.  It is unrelated to physical activities.  She denies associated symptoms.  She feels good otherwise.  She has no significant dyspnea, palpitations, dizziness, presyncope or syncope.  Her blood pressure has been elevated lately.  She has no previous diagnosis of hypertension.    Past Medical History:   Diagnosis Date    Arthritis of back Oct 22    On X-ray at last visit    GERD (gastroesophageal reflux disease)     Hernia     Hiatal    Knee swelling     Low back strain Nov 1    Slow to wake up after anesthesia          Current Outpatient Medications:     FeroSul 325 (65 Fe) MG tablet, , Disp: , Rfl:     multivitamin with minerals tablet tablet, Take 1 tablet by mouth Daily., Disp: , Rfl:     pantoprazole (PROTONIX) 40 MG EC tablet, Take 1 tablet by mouth Daily., Disp: 90 tablet, Rfl: 3    amLODIPine (NORVASC) 5 MG tablet, Take 1 tablet by mouth Daily., Disp: 90 tablet, Rfl: 3    There are no discontinued medications.  No Known Allergies     Social History     Tobacco Use    Smoking status: Never   Substance Use Topics    Alcohol use: Yes     Comment: rarely       Family History   Problem Relation Age of Onset    Cancer Mother         Pancreatic    Osteoporosis Mother     Pancreatic cancer Mother     Colon polyps Father     Colon cancer Maternal Aunt         Objective     /84   Pulse 65   Ht 162.6 cm (64.02\")   Wt 80.3 kg (177 lb)   BMI 30.37 kg/mý       Physical Exam  Constitutional:       General: Awake. Not in acute distress.     Appearance: Normal appearance.   Neck:      Vascular: No carotid bruit, hepatojugular " reflux or JVD.   Cardiovascular:      Rate and Rhythm: Normal rate and regular rhythm.      Chest Wall: PMI is not displaced.      Heart sounds: Normal heart sounds, S1 normal and S2 normal. No murmur heard.   No friction rub. No gallop. No S3 or S4 sounds.    Pulmonary:      Effort: Pulmonary effort is normal.      Breath sounds: Normal breath sounds. No wheezing, rhonchi or rales.   Ext.      Right lower leg: No edema.      Left lower leg: No edema.   Skin:     General: Skin is warm and dry.      Coloration: Skin is not cyanotic.      Findings: No petechiae or rash.   Neurological:      Mental Status: Alert and oriented x 3  Psychiatric:         Behavior: Behavior is cooperative.       Result Review :     proBNP   Date Value Ref Range Status   08/27/2023 67.7 0.0 - 900.0 pg/mL Final     CMP          8/27/2023    09:18   CMP   Glucose 212    BUN 12    Creatinine 1.00    EGFR 63.4    Sodium 136    Potassium 3.8    Chloride 102    Calcium 10.8    Total Protein 6.9    Albumin 4.2    Globulin 2.7    Total Bilirubin 0.9    Alkaline Phosphatase 101    AST (SGOT) 60    ALT (SGPT) 52    Albumin/Globulin Ratio 1.6    BUN/Creatinine Ratio 12.0    Anion Gap 12.2      CBC w/diff          12/6/2022    11:04 12/20/2022    15:12 8/27/2023    08:43   CBC w/Diff   WBC  5.79  6.37    RBC  4.19  5.03    Hemoglobin 6.3  9.1  15.6    Hematocrit 22.4  30.9  43.8    MCV  73.7  87.1    MCH  21.7  31.0    MCHC  29.4  35.6    RDW  20.3  12.3    Platelets  376  266    Neutrophil Rel %   68.9    Immature Granulocyte Rel %   0.5    Lymphocyte Rel %   18.8    Monocyte Rel %   7.7    Eosinophil Rel %   3.0    Basophil Rel %   1.1       No results found for: TSH   No results found for: FREET4   No results found for: DDIMERQUANT  Magnesium   Date Value Ref Range Status   08/27/2023 1.9 1.6 - 2.4 mg/dL Final      No results found for: DIGOXIN   Lab Results   Component Value Date    TROPONINT 14 (H) 08/27/2023      No results found for: POCTROP(                    No results found for this or any previous visit.                Diagnoses and all orders for this visit:    1. Chest heaviness (Primary)    2. Essential hypertension  -     amLODIPine (NORVASC) 5 MG tablet; Take 1 tablet by mouth Daily.  Dispense: 90 tablet; Refill: 3      Assessment:     Chest heaviness: She has sporadic episodes of anterior chest comfort/chest heaviness as described in HPI.  Her exam is benign.  Recent ECG was noted and shows sinus rhythm without ischemic ST-T changes.  She has an intermediate pretest probability for CAD.  She will be scheduled for treadmill stress test to assess for ischemic ST-T changes.  Echo will be done for LVEF, wall motion and valvular function.  She will be started on amlodipine for blood pressure control.  Further recommendations to follow.    Essential hypertension: Newly diagnosed.  She will be started on amlodipine as discussed above.  Lifestyle changes including dietary changes, regular exercise and weight loss were discussed with the patient.  Home blood pressure monitoring was recommended.  Lipid profile will be checked.  Lipid profile will be checked.    Follow Up       No follow-ups on file.    Patient was given instructions and counseling regarding her condition or for health maintenance advice. Please see specific information pulled into the AVS if appropriate.

## 2023-10-25 ENCOUNTER — HOSPITAL ENCOUNTER (OUTPATIENT)
Dept: MAMMOGRAPHY | Facility: HOSPITAL | Age: 63
Discharge: HOME | End: 2023-10-25
Payer: COMMERCIAL

## 2023-10-25 ENCOUNTER — HOSPITAL ENCOUNTER (OUTPATIENT)
Dept: ULTRASOUND IMAGING | Facility: HOSPITAL | Age: 63
Discharge: HOME | End: 2023-10-25
Payer: COMMERCIAL

## 2023-10-25 DIAGNOSIS — R92.8 ABNORMAL MAMMOGRAM: ICD-10-CM

## 2023-10-25 PROCEDURE — G0279 TOMOSYNTHESIS, MAMMO: HCPCS

## 2023-10-25 PROCEDURE — 77065 DX MAMMO INCL CAD UNI: CPT

## 2023-11-02 ENCOUNTER — TRANSCRIBE ORDERS (OUTPATIENT)
Dept: ADMINISTRATIVE | Facility: HOSPITAL | Age: 63
End: 2023-11-02
Payer: COMMERCIAL

## 2023-11-02 DIAGNOSIS — R92.0 MICROCALCIFICATIONS OF THE BREAST: Primary | ICD-10-CM

## 2023-11-08 ENCOUNTER — TELEPHONE (OUTPATIENT)
Dept: GASTROENTEROLOGY | Facility: CLINIC | Age: 63
End: 2023-11-08
Payer: COMMERCIAL

## 2023-11-08 ENCOUNTER — OFFICE VISIT (OUTPATIENT)
Dept: ORTHOPEDIC SURGERY | Facility: CLINIC | Age: 63
End: 2023-11-08
Payer: COMMERCIAL

## 2023-11-08 VITALS — WEIGHT: 175.4 LBS | BODY MASS INDEX: 29.94 KG/M2 | HEIGHT: 64 IN | TEMPERATURE: 97.8 F

## 2023-11-08 DIAGNOSIS — M19.019 SHOULDER ARTHRITIS: Primary | ICD-10-CM

## 2023-11-08 RX ORDER — LIDOCAINE HYDROCHLORIDE 10 MG/ML
2 INJECTION, SOLUTION EPIDURAL; INFILTRATION; INTRACAUDAL; PERINEURAL
Status: COMPLETED | OUTPATIENT
Start: 2023-11-08 | End: 2023-11-08

## 2023-11-08 RX ORDER — METHYLPREDNISOLONE ACETATE 80 MG/ML
1 INJECTION, SUSPENSION INTRA-ARTICULAR; INTRALESIONAL; INTRAMUSCULAR; SOFT TISSUE
Status: COMPLETED | OUTPATIENT
Start: 2023-11-08 | End: 2023-11-08

## 2023-11-08 RX ADMIN — METHYLPREDNISOLONE ACETATE 1 ML: 80 INJECTION, SUSPENSION INTRA-ARTICULAR; INTRALESIONAL; INTRAMUSCULAR; SOFT TISSUE at 10:02

## 2023-11-08 RX ADMIN — LIDOCAINE HYDROCHLORIDE 2 ML: 10 INJECTION, SOLUTION EPIDURAL; INFILTRATION; INTRACAUDAL; PERINEURAL at 10:02

## 2023-11-08 NOTE — TELEPHONE ENCOUNTER
CALLER: Carole Mckeon     RELATIONSHIP TO PATIENT: Self    BEST CALL BACK NUMBER: [phone]    CALL REGARDING: Returning Missed Call to Schedule Capsule    Patient Request a Call Back

## 2023-11-08 NOTE — PROGRESS NOTES
Ms. Mckeon comes in today for follow-up.  Injections have worked well in the past.  The patient would like to get a repeat injection today.  The risks, benefits and alternatives were discussed and the patient consented.  Going forward, the patient will follow-up as needed.    JUDE Acuña    11/08/2023      Large Joint Arthrocentesis: L glenohumeral  Date/Time: 11/8/2023 10:02 AM  Consent given by: patient  Site marked: site marked  Timeout: Immediately prior to procedure a time out was called to verify the correct patient, procedure, equipment, support staff and site/side marked as required   Supporting Documentation  Indications: pain   Procedure Details  Location: shoulder - L glenohumeral  Preparation: Patient was prepped and draped in the usual sterile fashion  Needle gauge: 21G.  Approach: anterior  Medications administered: 1 mL methylPREDNISolone acetate 80 MG/ML; 2 mL lidocaine PF 1% 1 %  Patient tolerance: patient tolerated the procedure well with no immediate complications

## 2023-11-09 ENCOUNTER — TELEPHONE (OUTPATIENT)
Dept: GASTROENTEROLOGY | Facility: CLINIC | Age: 63
End: 2023-11-09

## 2023-11-09 NOTE — TELEPHONE ENCOUNTER
Hub staff attempted to follow warm transfer process and was unsuccessful     Caller: Carole Mckeon    Relationship to patient: Self    Best call back number: [phone]     Patient is needing: PATIENT STATES THAT SHE GOT A CALL TODAY TO SCHEDULE HER CAPSULE STUDY. SHE STATES THAT SHE THOUGHT THIS HAD ALREADY BEEN SCHEDULED YESTERDAY FOR 11/28/23. PLEASE CALL BACK ASAP TO ADVISE.

## 2023-11-14 ENCOUNTER — OFFICE VISIT (OUTPATIENT)
Dept: CARDIOLOGY | Facility: CLINIC | Age: 63
End: 2023-11-14
Payer: COMMERCIAL

## 2023-11-14 VITALS
HEART RATE: 60 BPM | BODY MASS INDEX: 29.64 KG/M2 | DIASTOLIC BLOOD PRESSURE: 83 MMHG | SYSTOLIC BLOOD PRESSURE: 134 MMHG | WEIGHT: 173.6 LBS | OXYGEN SATURATION: 96 % | HEIGHT: 64 IN

## 2023-11-14 DIAGNOSIS — I10 ESSENTIAL HYPERTENSION: ICD-10-CM

## 2023-11-14 DIAGNOSIS — R07.89 CHEST HEAVINESS: Primary | ICD-10-CM

## 2023-11-14 PROCEDURE — 99214 OFFICE O/P EST MOD 30 MIN: CPT

## 2023-11-14 NOTE — PROGRESS NOTES
Chief Complaint  Follow-up    Subjective        History of Present Illness  Carole Mckeon presents to [facility] CARDIOLOGY for follow up.  Patient is a 63-year-old female with past medical history outlined below presents for follow-up on recent testing.  She was having some episodes of chest pain but she reports this has resolved completely.  She notes no chest pain or dyspnea.  She denies any dizziness, lightheadedness, palpitations, edema or syncope.      Past Medical History:   Diagnosis Date    Arthritis of back Oct 22    On X-ray at last visit    GERD (gastroesophageal reflux disease)     Hernia     Hiatal    Hypertension 10/23    Knee swelling     Low back strain Nov 1    Periarthritis of shoulder Oct22    Slow to wake up after anesthesia        ALLERGY  No Known Allergies     Past Surgical History:   Procedure Laterality Date    APPENDECTOMY  1960’s    COLONOSCOPY  2015    negative per pt    COLONOSCOPY N/A 12/30/2022    Procedure: COLONOSCOPY to cecum and TI:;  Surgeon: Sarwat Gaines MD;  Location: [facility] ENDOSCOPY;  Service: Gastroenterology;  Laterality: N/A;  pre:  iron deficiency anemia  post:  diverticulosis, TI normal, hemorrhoids      ENDOSCOPY N/A 12/30/2022    Procedure: ESOPHAGOGASTRODUODENOSCOPY with biopsies;  Surgeon: Sarwat Gaines MD;  Location: [facility] ENDOSCOPY;  Service: Gastroenterology;  Laterality: N/A;  pre;   iron deficiency anemia  post:  schatzkis ring, hiatal hernia    HIP SURGERY      B THR    JOINT REPLACEMENT      2002 and 2008    LAPAROSCOPIC CHOLECYSTECTOMY      UPPER GASTROINTESTINAL ENDOSCOPY  2015    hiatal hernia per pt        Social History     Socioeconomic History    Marital status:    Tobacco Use    Smoking status: Never   Vaping Use    Vaping Use: Never used   Substance and Sexual Activity    Alcohol use: Yes     Comment: rarely    Drug use: Never    Sexual activity: Never       Family History   Problem Relation Age of Onset     "Cancer Mother         Pancreatic    Osteoporosis Mother     Pancreatic cancer Mother     Hypertension Mother     Colon polyps Father     Asthma Father     Heart attack Father     Hypertension Father     Colon cancer Maternal Aunt         Current Outpatient Medications on File Prior to Visit   Medication Sig    amLODIPine (NORVASC) 5 MG tablet Take 1 tablet by mouth Daily.    multivitamin with minerals tablet tablet Take 1 tablet by mouth Daily.    OLMESARTAN MEDOXOMIL PO     pantoprazole (PROTONIX) 40 MG EC tablet Take 1 tablet by mouth Daily.     No current facility-administered medications on file prior to visit.       Objective   Vitals:    11/14/23 1255   BP: 134/83   BP Location: Left arm   Patient Position: Sitting   Cuff Size: Adult   Pulse: 60   SpO2: 96%   Weight: 78.7 kg (173 lb 9.6 oz)   Height: 162.6 cm (64\")       Physical Exam  Constitutional:       General: She is awake. She is not in acute distress.     Appearance: Normal appearance.   HENT:      Head: Normocephalic.      Nose: Nose normal. No congestion.   Eyes:      Extraocular Movements: Extraocular movements intact.      Conjunctiva/sclera: Conjunctivae normal.      Pupils: Pupils are equal, round, and reactive to light.   Neck:      Thyroid: No thyromegaly.      Vascular: No JVD.   Cardiovascular:      Rate and Rhythm: Normal rate and regular rhythm.      Chest Wall: PMI is not displaced.      Pulses: Normal pulses.      Heart sounds: Normal heart sounds, S1 normal and S2 normal. No murmur heard.     No friction rub. No gallop. No S3 or S4 sounds.   Pulmonary:      Effort: Pulmonary effort is normal.      Breath sounds: Normal breath sounds. No wheezing, rhonchi or rales.   Abdominal:      General: Bowel sounds are normal.      Palpations: Abdomen is soft.      Tenderness: There is no abdominal tenderness.   Musculoskeletal:      Cervical back: No tenderness.      Right lower leg: No edema.      Left lower leg: No edema.   Lymphadenopathy:      " Cervical: No cervical adenopathy.   Skin:     General: Skin is warm and dry.      Capillary Refill: Capillary refill takes less than 2 seconds.      Coloration: Skin is not cyanotic.      Findings: No petechiae or rash.      Nails: There is no clubbing.   Neurological:      Mental Status: She is alert.   Psychiatric:         Mood and Affect: Mood normal.         Behavior: Behavior is cooperative.           Result Review     The following data was reviewed by JUDE Stephenson on 11/14/23.    proBNP   Date Value Ref Range Status   08/27/2023 67.7 0.0 - 900.0 pg/mL Final     CMP          8/27/2023    09:18   CMP   Glucose 212    BUN 12    Creatinine 1.00    EGFR 63.4    Sodium 136    Potassium 3.8    Chloride 102    Calcium 10.8    Total Protein 6.9    Albumin 4.2    Globulin 2.7    Total Bilirubin 0.9    Alkaline Phosphatase 101    AST (SGOT) 60    ALT (SGPT) 52    Albumin/Globulin Ratio 1.6    BUN/Creatinine Ratio 12.0    Anion Gap 12.2      CBC w/diff          12/6/2022    11:04 12/20/2022    15:12 8/27/2023    08:43   CBC w/Diff   WBC  5.79  6.37    RBC  4.19  5.03    Hemoglobin 6.3  9.1  15.6    Hematocrit 22.4  30.9  43.8    MCV  73.7  87.1    MCH  21.7  31.0    MCHC  29.4  35.6    RDW  20.3  12.3    Platelets  376  266    Neutrophil Rel %   68.9    Immature Granulocyte Rel %   0.5    Lymphocyte Rel %   18.8    Monocyte Rel %   7.7    Eosinophil Rel %   3.0    Basophil Rel %   1.1           Results for orders placed in visit on 10/25/23    Adult Transthoracic Echo Complete W/ Cont if Necessary Per Protocol    Interpretation Summary    Left ventricular systolic function is normal. Calculated left ventricular EF = 66.9%    Left ventricular diastolic function was normal.    Estimated right ventricular systolic pressure from tricuspid regurgitation is normal (<35 mmHg).    No hemodynamically significant valvular pathology.    Results for orders placed in visit on 10/25/23    Treadmill Stress  Test    Interpretation Summary  Adequate aerobic function capacity.  Study was terminated secondary to achieving protocol.  No angina was reported.  Resting blood pressure was elevated with exaggerated blood pressure response to exercise.  Normal heart rate response to exercise.  No significant arrhythmias were noted.  At peak stress, nonischemic upsloping ST depression was noted in the inferolateral leads.  Impressions are consistent with low risk study.  Patient needs blood pressure control.         Procedures    Assessment & Plan  Diagnoses and all orders for this visit:    1. Chest heaviness (Primary)    2. Essential hypertension    1.  Her symptoms have resolved completely she.  She notes no further episodes.  Recent stress test was low risk.  Recent echocardiogram was normal with no hemodynamically significant valvular pathology and normal LVEF.  2.  Blood pressure is well controlled on current regimen of olmesartan and amlodipine.  Continue the same.    Follow Up   Return if symptoms worsen or fail to improve.    Patient was given instructions and counseling regarding her condition or for health maintenance advice. Please see specific information pulled into the AVS if appropriate.     Lore Bonilla, APRN  11/14/23  12:55 EST    Dictated Utilizing Dragon Dictation

## 2023-11-27 ENCOUNTER — TELEPHONE (OUTPATIENT)
Dept: GASTROENTEROLOGY | Facility: CLINIC | Age: 63
End: 2023-11-27
Payer: COMMERCIAL

## 2023-11-27 NOTE — TELEPHONE ENCOUNTER
CALLER: Carole Lile    RELATIONSHIP TO PATIENT: Self    BEST CALL BACK NUMBER: [phone]    CALL REGARDING: Ms. Mckeon informed that she did not receive prep instructions in mail or on [employer] in reference to Capsule Study that is scheduled for tomorrow 11/28/2023      Ms. Coyle sent prep instructions to Ms. Mckeon via [employer]    Ms. Mckeon has confirmed that she has received prep instructions in [employer] while on phone

## 2023-11-27 NOTE — TELEPHONE ENCOUNTER
----- Message from Carole Mckeon sent at 11/27/2023  8:53 AM EST -----  Regarding: Video capsule test  Contact: [phone]  I have test scheduled for tomorrow morning but haven’t received prep info. Was supposed to come In the mail but didn’t receive.

## 2023-11-27 NOTE — TELEPHONE ENCOUNTER
Hub staff attempted to follow warm transfer process and was unsuccessful     Caller: Carole Mckeon    Relationship to patient: Self    Best call back number:     Patient is needing: PT HAS A CAPSULE STUDY TOMORROW AND NEVER RECEIVED THE PRE-OP INSTRUCTIONS THROUGH [facility]

## 2023-11-28 ENCOUNTER — TELEPHONE (OUTPATIENT)
Dept: GASTROENTEROLOGY | Facility: CLINIC | Age: 63
End: 2023-11-28
Payer: COMMERCIAL

## 2023-11-28 ENCOUNTER — CLINICAL SUPPORT (OUTPATIENT)
Dept: GASTROENTEROLOGY | Facility: CLINIC | Age: 63
End: 2023-11-28
Payer: COMMERCIAL

## 2023-11-28 DIAGNOSIS — D50.9 IRON DEFICIENCY ANEMIA, UNSPECIFIED IRON DEFICIENCY ANEMIA TYPE: Primary | ICD-10-CM

## 2023-11-28 PROCEDURE — 91110 GI TRC IMG INTRAL ESOPH-ILE: CPT | Performed by: INTERNAL MEDICINE

## 2023-12-15 ENCOUNTER — TELEPHONE (OUTPATIENT)
Dept: GASTROENTEROLOGY | Facility: CLINIC | Age: 63
End: 2023-12-15
Payer: COMMERCIAL

## 2023-12-15 NOTE — TELEPHONE ENCOUNTER
Left message requesting call back.     Per verbal order of Dr. Gaines: Capsule study normal, no source of bleeding seen. Patient to follow up with PCP for CBC and Iron studies.   Results of capsule study faxed to PCP. Confirmation received.

## 2024-02-19 ENCOUNTER — OFFICE VISIT (OUTPATIENT)
Dept: ORTHOPEDIC SURGERY | Facility: CLINIC | Age: 64
End: 2024-02-19
Payer: COMMERCIAL

## 2024-02-19 VITALS — BODY MASS INDEX: 28.22 KG/M2 | HEIGHT: 64 IN | WEIGHT: 165.3 LBS | TEMPERATURE: 98.3 F

## 2024-02-19 DIAGNOSIS — M25.512 CHRONIC LEFT SHOULDER PAIN: Primary | ICD-10-CM

## 2024-02-19 DIAGNOSIS — M19.012 ARTHRITIS OF LEFT SHOULDER REGION: ICD-10-CM

## 2024-02-19 DIAGNOSIS — G89.29 CHRONIC LEFT SHOULDER PAIN: Primary | ICD-10-CM

## 2024-02-19 PROCEDURE — 20610 DRAIN/INJ JOINT/BURSA W/O US: CPT | Performed by: NURSE PRACTITIONER

## 2024-02-19 PROCEDURE — 73030 X-RAY EXAM OF SHOULDER: CPT | Performed by: NURSE PRACTITIONER

## 2024-02-19 RX ORDER — LIDOCAINE HYDROCHLORIDE 10 MG/ML
2 INJECTION, SOLUTION EPIDURAL; INFILTRATION; INTRACAUDAL; PERINEURAL
Status: COMPLETED | OUTPATIENT
Start: 2024-02-19 | End: 2024-02-19

## 2024-02-19 RX ORDER — SEMAGLUTIDE 0.68 MG/ML
INJECTION, SOLUTION SUBCUTANEOUS
COMMUNITY
Start: 2024-01-09

## 2024-02-19 RX ORDER — METHYLPREDNISOLONE ACETATE 80 MG/ML
80 INJECTION, SUSPENSION INTRA-ARTICULAR; INTRALESIONAL; INTRAMUSCULAR; SOFT TISSUE
Status: COMPLETED | OUTPATIENT
Start: 2024-02-19 | End: 2024-02-19

## 2024-02-19 RX ADMIN — METHYLPREDNISOLONE ACETATE 80 MG: 80 INJECTION, SUSPENSION INTRA-ARTICULAR; INTRALESIONAL; INTRAMUSCULAR; SOFT TISSUE at 13:44

## 2024-02-19 RX ADMIN — LIDOCAINE HYDROCHLORIDE 2 ML: 10 INJECTION, SOLUTION EPIDURAL; INFILTRATION; INTRACAUDAL; PERINEURAL at 13:44

## 2024-02-19 NOTE — PROGRESS NOTES
Ms. Mckeon comes in today for follow-up.  Injections have worked well in the past.  The patient would like to get a repeat injection today.      Imaging:  AP, scapular Y, and axillary views of the left shoulder are ordered by myself and reviewed to evaluate the patient's complaint.  These are compared to previous x-rays.  The x-rays show mild glenohumeral osteoarthritis with joint space narrowing, osteophyte formation, and subchondral sclerosis.  The acromiohumeral interval measures normal.  Loose body noted in the axillary pouch.  Mild degenerative changes noted compared to previous x-rays.    The risks, benefits and alternatives were discussed and the patient consented.  Going forward, the patient will follow-up as needed.    Roberta Tello, JUDE    02/19/2024    Large Joint Arthrocentesis: L glenohumeral  Date/Time: 2/19/2024 1:44 PM  Consent given by: patient  Site marked: site marked  Timeout: Immediately prior to procedure a time out was called to verify the correct patient, procedure, equipment, support staff and site/side marked as required   Supporting Documentation  Indications: pain   Procedure Details  Location: shoulder - L glenohumeral  Preparation: Patient was prepped and draped in the usual sterile fashion  Needle gauge: 21 G.  Approach: anterior  Medications administered: 2 mL lidocaine PF 1% 1 %; 80 mg methylPREDNISolone acetate 80 MG/ML  Patient tolerance: patient tolerated the procedure well with no immediate complications

## 2024-03-13 ENCOUNTER — HOSPITAL ENCOUNTER (OUTPATIENT)
Dept: MAMMOGRAPHY | Facility: HOSPITAL | Age: 64
Discharge: HOME | End: 2024-03-13
Payer: COMMERCIAL

## 2024-03-13 ENCOUNTER — HOSPITAL ENCOUNTER (OUTPATIENT)
Dept: ULTRASOUND IMAGING | Facility: HOSPITAL | Age: 64
Discharge: HOME | End: 2024-03-13
Payer: COMMERCIAL

## 2024-03-13 DIAGNOSIS — R92.0 MICROCALCIFICATIONS OF THE BREAST: ICD-10-CM

## 2024-03-13 PROCEDURE — 77066 DX MAMMO INCL CAD BI: CPT

## 2024-03-13 PROCEDURE — G0279 TOMOSYNTHESIS, MAMMO: HCPCS

## 2024-04-24 NOTE — TELEPHONE ENCOUNTER
Hub staff attempted to follow warm transfer process and was unsuccessful     Caller: Carole Mckeon    Relationship to patient: Self    Best call back number: [phone]     Patient is needing: PATIENT STATES THAT SHE GOT A CALL TODAY TO SCHEDULE HER CAPSULE STUDY. SHE STATES THAT SHE THOUGHT THIS HAD ALREADY BEEN SCHEDULED YESTERDAY FOR 11/28/23. PLEASE CALL BACK ASAP TO ADVISE.                  Vit d will check supplement and titrate.

## 2024-12-11 PROCEDURE — 87086 URINE CULTURE/COLONY COUNT: CPT | Performed by: PHYSICIAN ASSISTANT

## 2024-12-31 ENCOUNTER — HOSPITAL ENCOUNTER (EMERGENCY)
Facility: HOSPITAL | Age: 64
Discharge: HOME | End: 2024-12-31
Attending: EMERGENCY MEDICINE | Admitting: EMERGENCY MEDICINE
Payer: OTHER MISCELLANEOUS

## 2024-12-31 ENCOUNTER — APPOINTMENT (OUTPATIENT)
Dept: GENERAL RADIOLOGY | Facility: HOSPITAL | Age: 64
End: 2024-12-31
Payer: OTHER MISCELLANEOUS

## 2024-12-31 VITALS
RESPIRATION RATE: 20 BRPM | OXYGEN SATURATION: 99 % | TEMPERATURE: 98.6 F | HEART RATE: 73 BPM | SYSTOLIC BLOOD PRESSURE: 132 MMHG | DIASTOLIC BLOOD PRESSURE: 75 MMHG

## 2024-12-31 DIAGNOSIS — M25.432 PAIN AND SWELLING OF LEFT WRIST: ICD-10-CM

## 2024-12-31 DIAGNOSIS — M25.532 PAIN AND SWELLING OF LEFT WRIST: ICD-10-CM

## 2024-12-31 DIAGNOSIS — S52.502A CLOSED FRACTURE OF DISTAL END OF LEFT RADIUS, UNSPECIFIED FRACTURE MORPHOLOGY, INITIAL ENCOUNTER: Primary | ICD-10-CM

## 2024-12-31 PROCEDURE — 73100 X-RAY EXAM OF WRIST: CPT

## 2024-12-31 PROCEDURE — 99283 EMERGENCY DEPT VISIT LOW MDM: CPT

## 2024-12-31 RX ORDER — TRAMADOL HYDROCHLORIDE 50 MG/1
50 TABLET ORAL EVERY 8 HOURS PRN
Qty: 12 TABLET | Refills: 0 | Status: SHIPPED | OUTPATIENT
Start: 2024-12-31

## 2024-12-31 RX ORDER — TRAMADOL HYDROCHLORIDE 50 MG/1
50 TABLET ORAL ONCE
Status: COMPLETED | OUTPATIENT
Start: 2024-12-31 | End: 2024-12-31

## 2024-12-31 RX ADMIN — TRAMADOL HYDROCHLORIDE 50 MG: 50 TABLET, COATED ORAL at 13:16

## 2024-12-31 NOTE — DISCHARGE INSTRUCTIONS
Please follow-up with the orthopedic specialist.  Someone from his office should reach out to you within 2-3 business days to schedule an appointment.  If you prefer to see your orthopedic surgeon at [facility] they will please call them to schedule an appointment.  Please take the medications prescribed you as directed.  If you have an increase in pain, become unable to move your fingers, or develop any other concerns surrounding your injury please return to the ER.  Otherwise follow-up with the orthopedic specialist

## 2024-12-31 NOTE — ED PROVIDER NOTES
Time: 12:32 PM EST  Date of encounter:  12/31/2024  Room number: 56/56  Independent Historian/Clinical History and Information was obtained by:   Patient    History is limited by: N/A    Chief Complaint: left wrist pain       History of Present Illness:  Patient is a 64 y.o. year old female who presents to the emergency department for evaluation of left wrist pain.  Patient was at work demonstrating an exercise on a stability ball when she fell backwards catching herself with her left wrist.  She presents swelling and pain.  Patient did apply an ice pack to the area prior to arriving to the emergency department.  She did not her head at time of fall and has no other physical complaints.  Patient is not left-hand-dominant.    HPI    Patient Care Team  Primary Care Provider: Chichi Caballero APRN    Past Medical History:     No Known Allergies  Past Medical History:   Diagnosis Date    Arthritis of back Oct 22    On X-ray at last visit    Frozen shoulder     GERD (gastroesophageal reflux disease)     Hernia     Hiatal    Hypertension 10/23    Knee swelling     Low back strain Nov 1    Periarthritis of shoulder Oct22    Slow to wake up after anesthesia      Past Surgical History:   Procedure Laterality Date    APPENDECTOMY  1960’s    COLONOSCOPY  2015    negative per pt    COLONOSCOPY N/A 12/30/2022    Procedure: COLONOSCOPY to cecum and TI:;  Surgeon: Sarwat Gaines MD;  Location: [facility];  Service: Gastroenterology;  Laterality: N/A;  pre:  iron deficiency anemia  post:  diverticulosis, TI normal, hemorrhoids      ENDOSCOPY N/A 12/30/2022    Procedure: ESOPHAGOGASTRODUODENOSCOPY with biopsies;  Surgeon: Sarwat Gaines MD;  Location: [facility];  Service: Gastroenterology;  Laterality: N/A;  pre;   iron deficiency anemia  post:  schatzkis ring, hiatal hernia    HIP SURGERY      B THR    JOINT REPLACEMENT      2002 and 2008    LAPAROSCOPIC CHOLECYSTECTOMY      UPPER GASTROINTESTINAL ENDOSCOPY   2015    hiatal hernia per pt     Family History   Problem Relation Age of Onset    Cancer Mother         Pancreatic    Osteoporosis Mother     Pancreatic cancer Mother     Hypertension Mother     Colon polyps Father     Asthma Father     Heart attack Father     Hypertension Father     Colon cancer Maternal Aunt        Home Medications:  Prior to Admission medications    Medication Sig Start Date End Date Taking? Authorizing Provider   amLODIPine (NORVASC) 5 MG tablet Take 1 tablet by mouth Daily. 9/1/23   Mikki Will MD   multivitamin with minerals tablet tablet Take 1 tablet by mouth Daily.    Provider, MD Margarito   pantoprazole (PROTONIX) 40 MG EC tablet Take 1 tablet by mouth Daily. 12/30/22   Sarwat Gaines MD        Social History:   Social History     Tobacco Use    Smoking status: Never    Smokeless tobacco: Never   Vaping Use    Vaping status: Never Used   Substance Use Topics    Alcohol use: Yes     Comment: rarely    Drug use: Never         Review of Systems:  Review of Systems     I performed a review of systems today, which was all negative, except for the positives found in the HPI above.    Physical Exam:  /75 (BP Location: Right arm, Patient Position: Sitting)   Pulse 73   Temp 98.6 °F (37 °C) (Oral)   Resp 20   SpO2 99%     Physical Exam   General:  Awake alert no apparent distress    Head: Normocephalic, atraumatic, eyes PERRLA EOMI, nose normal, oropharynx normal    Neck: Supple, no meningismus, no cervical lymphadenopathy or JVD    Heart: Regular rate and rhythm, no murmurs or rubs, 2+ radial pulses    Lungs: Clear to auscultation bilaterally, no wheezing or rhonchi or rales, no respiratory distress    Abdomen: Soft, nontender, nondistended, no signs of peritonitis    Skin: Warm, dry, no rash    Musculoskeletal: Normal range of motion, no obvious deformities, no edema noted in all 4 extremities with the exception of the left wrist.  There is obvious edema to the wrist  but no gross deformity.  PMS intact and cap refill WNL.  Range of motion to left wrist is slightly diminished due to pain and discomfort.    Neurologic: Awake, alert, oriented x 3, no motor or sensory deficits appreciated    Psych: No suicidal or homicidal ideations, no psychosis          Procedures:  Procedures      Medical Decision Making:      Comorbidities that affect care:    Arthritis, low back pain, periarthritis of shoulder, frozen shoulder, GERD, hernia, knee pain, hypertension    External Notes reviewed:          The following orders were placed and all results were independently analyzed by me:  Orders Placed This Encounter   Procedures    [facility] 02.  Shoulder Immobilizer/Sling    XR Wrist 2 View Left    Ambulatory Referral to Orthopedic Surgery    Obtain & Apply The Following- Upper extremity    Obtain & Apply The Following- Upper extremity; Sling       Medications Given in the Emergency Department:  Medications   traMADol (ULTRAM) tablet 50 mg (50 mg Oral Given 12/31/24 1316)        ED Course:    ED Course as of 12/31/24 1641   Tue Dec 31, 2024   1345 I have discussed this patient with on-call orthopedic surgeon Dr. Cuello. [MS]      ED Course User Index  [MS] Yaneth Cannon APRN       Labs:    Lab Results (last 24 hours)       ** No results found for the last 24 hours. **             Imaging:    XR Wrist 2 View Left    Result Date: 12/31/2024  XR WRIST 2 VW LEFT Date of Exam: 12/31/2024 11:44 AM EST Indication: tai off exercise ball from sitting position Comparison: None available. Findings: 3 views. There is a transverse mildly impacted fracture of the distal radius without significant displacement or evidence of angulation. No joint involvement is identified. The distal fracture fragment continues to articulate normally with the adjacent carpals.     Impression: Mildly impacted distal radial fracture. Electronically Signed: Sara Garay MD  12/31/2024 11:56 AM EST   Workstation ID: [id number]       Differential Diagnosis and Discussion:    Extremity Pain: Differential diagnosis includes but is not limited to soft tissue sprain, tendonitis, tendon injury, dislocation, fracture, deep vein thrombosis, arterial insufficiency, osteoarthritis, bursitis, and ligamentous damage.    X-ray were performed in the emergency department and all X-ray impressions were independently interpreted by me.    [facility]                 Patient Care Considerations:    MRI: I considered ordering an MRI however patient had no neurodeficits      Consultants/Shared Management Plan:    Consultant: I have discussed the case with Dr. Cuello who states patient can follow-up with him in clinic.    Social Determinants of Health:    Patient is independent, reliable, and has access to care.       Disposition and Care Coordination:    Discharged: The patient is suitable and stable for discharge with no need for consideration of admission.    I have explained the patient´s condition, diagnoses and treatment plan based on the information available to me at this time. I have answered questions and addressed any concerns. The patient has a good  understanding of the patient´s diagnosis, condition, and treatment plan as can be expected at this point. The vital signs have been stable. The patient´s condition is stable and appropriate for discharge from the emergency department.      The patient will pursue further outpatient evaluation with the primary care physician or other designated or consulting physician as outlined in the discharge instructions. They are agreeable to this plan of care and follow-up instructions have been explained in detail. The patient has received these instructions in written format and has expressed an understanding of the discharge instructions. The patient is aware that any significant change in condition or worsening of symptoms should prompt an immediate return to this or the closest emergency  department or call to 911.    Final diagnoses:   Closed fracture of distal end of left radius, unspecified fracture morphology, initial encounter   Pain and swelling of left wrist        ED Disposition       ED Disposition   Discharge    Condition   Stable    Comment   --               This medical record created using voice recognition software.       Yaneth Cannon, APRN  12/31/24 2607

## 2025-01-02 ENCOUNTER — TELEPHONE (OUTPATIENT)
Dept: ORTHOPEDIC SURGERY | Facility: CLINIC | Age: 65
End: 2025-01-02
Payer: COMMERCIAL

## 2025-01-02 NOTE — TELEPHONE ENCOUNTER
Closed fracture of distal end of left radius, Pain and swelling of left wrist - PROG NOTE ED 12.31.24 - XR 12.31.24

## 2025-01-10 ENCOUNTER — OFFICE VISIT (OUTPATIENT)
Dept: ORTHOPEDIC SURGERY | Facility: CLINIC | Age: 65
End: 2025-01-10
Payer: OTHER MISCELLANEOUS

## 2025-01-10 VITALS
SYSTOLIC BLOOD PRESSURE: 158 MMHG | BODY MASS INDEX: 28.51 KG/M2 | HEART RATE: 71 BPM | WEIGHT: 167 LBS | DIASTOLIC BLOOD PRESSURE: 84 MMHG | OXYGEN SATURATION: 97 % | HEIGHT: 64 IN

## 2025-01-10 DIAGNOSIS — S62.102A CLOSED FRACTURE OF LEFT WRIST, INITIAL ENCOUNTER: ICD-10-CM

## 2025-01-10 DIAGNOSIS — M25.532 LEFT WRIST PAIN: Primary | ICD-10-CM

## 2025-01-10 NOTE — PROGRESS NOTES
"Chief Complaint  Pain and Initial Evaluation of the Left Wrist    Subjective          Carole Mckeon presents to [facility] for   History of Present Illness    Carole presents today for evaluation of her left wrist.  She fell at work on 12/31 landing on the left wrist.  She had immediate pain.  X-rays revealed an impacted distal radius fracture and she was placed into a splint.  She is right-hand dominant.  No prior left wrist injuries or surgeries per her report.    No Known Allergies     Social History     Socioeconomic History    Marital status:    Tobacco Use    Smoking status: Never    Smokeless tobacco: Never   Vaping Use    Vaping status: Never Used   Substance and Sexual Activity    Alcohol use: Yes     Comment: rarely    Drug use: Never    Sexual activity: Never        I reviewed the patient's chief complaint, history of present illness, review of systems, past medical history, surgical history, family history, social history, medications, and allergy list.     REVIEW OF SYSTEMS    Constitutional: Denies fevers, chills, weight loss  Cardiovascular: Denies chest pain, shortness of breath  Skin: Denies rashes, acute skin changes  Neurologic: Denies headache, loss of consciousness  MSK: Left wrist pain      Objective   Vital Signs:   /84   Pulse 71   Ht 162.6 cm (64\")   Wt 75.8 kg (167 lb)   SpO2 97%   BMI 28.67 kg/m²     Body mass index is 28.67 kg/m².    Physical Exam    General: Alert. No acute distress.   Left upper extremity: Sugar-tong splint is clean, dry, intact.  No drainage noted.  Intact active finger flexion and extension.  Swelling noted in the hand.  Neurovascular intact distally.    Orthopedic Injury Treatment    Date/Time: 1/10/2025 10:20 AM    Performed by: Maite Angel MA  Authorized by: Familia Cuello MD  Injury location: wrist  Location details: left wrist  Immobilization: cast  Splint type: volar short arm  Supplies used: cotton padding " (fiberglass)  Patient tolerance: patient tolerated the procedure well with no immediate complications  Comments: Closed treatment was obtained and fiberglass cast was applied.  The patient tolerated the procedure without any complications.        Patient was placed in fiberglass cast today.  The patient tolerated the procedure without any complications.    Imaging Results (Most Recent)       Procedure Component Value Units Date/Time    XR Wrist 2 View Left [[id number]] Resulted: 01/10/25 1011     Updated: 01/10/25 1011    Narrative:      Indications: Follow-up left distal radius fracture    Views: AP and lateral left wrist    Findings: Stable appearing impacted distal radius fracture.  Splint   material in place.  DRUJ appears reduced.    Comparative Data: Comparative data found and reviewed today                     Assessment and Plan        XR Wrist 2 View Left    Result Date: 1/10/2025  Narrative: Indications: Follow-up left distal radius fracture Views: AP and lateral left wrist Findings: Stable appearing impacted distal radius fracture.  Splint material in place.  DRUJ appears reduced. Comparative Data: Comparative data found and reviewed today    XR Wrist 2 View Left    Result Date: 12/31/2024  Narrative: XR WRIST 2 VW LEFT Date of Exam: 12/31/2024 11:44 AM EST Indication: tai off exercise ball from sitting position Comparison: None available. Findings: 3 views. There is a transverse mildly impacted fracture of the distal radius without significant displacement or evidence of angulation. No joint involvement is identified. The distal fracture fragment continues to articulate normally with the adjacent carpals.     Impression: Impression: Mildly impacted distal radial fracture. Electronically Signed: Sara Garay MD  12/31/2024 11:56 AM EST  Workstation ID: [id number]      Diagnoses and all orders for this visit:    1. Left wrist pain (Primary)  -     XR Wrist 2 View Left    2. Closed fracture of left wrist,  initial encounter    Other orders  -     Orthopedic Injury Treatment        We reviewed her imaging.  We discussed nonoperative care.  She was transition to a well-padded short arm cast today.  Cast care reviewed.  No use of the left hand in the cast.  Instructed on elbow and finger range of motion exercises.  Ice and elevate as needed for pain and swelling.  Follow-up in 1 week for reevaluation.  Will obtain new x-rays of the left wrist in the cast.  Anticipate 6 weeks of casting.      Call or return if worsening symptoms.    Scribed for Familia Cuello MD by Maite Angel MA  01/10/2025   09:50 EST         Follow Up       1 week    Patient was given instructions and counseling regarding her condition or for health maintenance advice. Please see specific information pulled into the AVS if appropriate.     I have personally performed the services described in this document as scribed by the above individual and it is both accurate and complete. Familia Cuello MD 01/10/25 12:59 EST

## 2025-01-13 ENCOUNTER — TELEPHONE (OUTPATIENT)
Dept: ORTHOPEDIC SURGERY | Facility: CLINIC | Age: 65
End: 2025-01-13
Payer: COMMERCIAL

## 2025-01-13 NOTE — TELEPHONE ENCOUNTER
LT VW TO INFORM PT HER SCHEDULE HAS BEEN MOVED FROM 100pm TO 1100am OK FOR HUB TO RESCHEDULE IF PT IS UNAVAILABLE

## 2025-01-16 ENCOUNTER — OFFICE VISIT (OUTPATIENT)
Dept: ORTHOPEDIC SURGERY | Facility: CLINIC | Age: 65
End: 2025-01-16
Payer: OTHER MISCELLANEOUS

## 2025-01-16 VITALS — HEART RATE: 65 BPM | DIASTOLIC BLOOD PRESSURE: 72 MMHG | OXYGEN SATURATION: 96 % | SYSTOLIC BLOOD PRESSURE: 140 MMHG

## 2025-01-16 DIAGNOSIS — S62.102D CLOSED FRACTURE OF LEFT WRIST WITH ROUTINE HEALING, SUBSEQUENT ENCOUNTER: ICD-10-CM

## 2025-01-16 DIAGNOSIS — M25.532 LEFT WRIST PAIN: Primary | ICD-10-CM

## 2025-01-16 NOTE — PROGRESS NOTES
Chief Complaint  Follow-up of the Left Wrist    Subjective          Carole Mckeon presents to [facility]   History of Present Illness    Carole Mckeon presents today for a follow-up of her left wrist.  Patient has a left wrist fracture that we are treating conservatively.  Today, patient states that she is doing fine.  She states that her wrist is thinking about her cast is doing fine.  She has noted improvements in her swelling.  Denies numbness or tingling to her hand.      No Known Allergies     Social History     Socioeconomic History    Marital status:    Tobacco Use    Smoking status: Never    Smokeless tobacco: Never   Vaping Use    Vaping status: Never Used   Substance and Sexual Activity    Alcohol use: Yes     Comment: rarely    Drug use: Never    Sexual activity: Never        I reviewed the patient's chief complaint, history of present illness, review of systems, past medical history, surgical history, family history, social history, medications, and allergy list.     REVIEW OF SYSTEMS    Constitutional: Denies fevers, chills, weight loss  Cardiovascular: Denies chest pain, shortness of breath  Skin: Denies rashes, acute skin changes  Neurologic: Denies headache, loss of consciousness  MSK: Left wrist pain      Objective   Vital Signs:   /72   Pulse 65   SpO2 96%     There is no height or weight on file to calculate BMI.    Physical Exam    General: Alert. No acute distress.   Left upper extremity: Short arm cast in place today.  Cast intact, clean, dry.  No wounds about the edges of cast.  Elbow range of motion intact.  Finger range of motion intact.  Sensation intact to fingers.  Less than 2-second cap refill.    Procedures    Imaging Results (Most Recent)       Procedure Component Value Units Date/Time    XR Wrist 2 View Left [[id number]] Resulted: 01/16/25 1116     Updated: 01/16/25 1116    Narrative:      Indications: Follow-up left wrist fracture    Views: AP  and lateral left wrist    Findings: Left distal radius impaction fracture is seen.  Fracture is   stable compared to previous films.  All joints appear reduced.  Casting   material in place today.    Comparative Data: Comparative data found and reviewed today.                   Assessment and Plan    Diagnoses and all orders for this visit:    1. Left wrist pain (Primary)  -     XR Wrist 2 View Left    2. Closed fracture of left wrist with routine healing, subsequent encounter        Carole Mckeon presents today to follow-up with her left wrist fracture that we are treating conservatively.  X-rays reviewed with the patient today.  Short arm cast in place today without complications.  Patient will continue to use short arm cast.  Anticipate 6 weeks of casting.  Continue with elbow and finger range of motion exercises.  Use ice and elevation to help with inflammation as needed.      Patient will follow up in 5 weeks for reevaluation.  We will obtain new x-rays of the left wrist without cast at next visit.      Call or return if symptoms worsen or patient has any concerns.       Follow Up   Return in about 5 weeks (around 2/20/2025).  Patient was given instructions and counseling regarding her condition or for health maintenance advice. Please see specific information pulled into the AVS if appropriate.     Jaimie Huffman PA-C  01/16/25  11:19 EST

## 2025-01-28 ENCOUNTER — TELEPHONE (OUTPATIENT)
Dept: ORTHOPEDIC SURGERY | Facility: CLINIC | Age: 65
End: 2025-01-28

## 2025-01-28 NOTE — TELEPHONE ENCOUNTER
The [facility] received a fax that requires your attention. The document has been indexed to the patient’s chart for your review.      Reason for sending: RECEIVED RECORDS REQUEST FROM [facility]    Documents Description: RECORDS [id number]-1/27/2025    Name of Sender: ABDELRAHMAN RIVERA    Date Indexed: 1/28/2025

## 2025-02-20 ENCOUNTER — OFFICE VISIT (OUTPATIENT)
Dept: ORTHOPEDIC SURGERY | Facility: CLINIC | Age: 65
End: 2025-02-20
Payer: OTHER MISCELLANEOUS

## 2025-02-20 VITALS
OXYGEN SATURATION: 98 % | WEIGHT: 168.6 LBS | HEIGHT: 64 IN | SYSTOLIC BLOOD PRESSURE: 132 MMHG | BODY MASS INDEX: 28.79 KG/M2 | HEART RATE: 76 BPM | DIASTOLIC BLOOD PRESSURE: 82 MMHG

## 2025-02-20 DIAGNOSIS — M25.532 LEFT WRIST PAIN: ICD-10-CM

## 2025-02-20 DIAGNOSIS — S62.102D CLOSED FRACTURE OF LEFT WRIST WITH ROUTINE HEALING, SUBSEQUENT ENCOUNTER: Primary | ICD-10-CM

## 2025-02-20 NOTE — PROGRESS NOTES
"Chief Complaint  Follow-up of the Left Wrist    Subjective          Carole Mckeon presents to [facility]   History of Present Illness    Carole Mckeon presents today for a follow-up of her left wrist.  Patient has a left wrist fracture that we are treating conservatively.  Today, patient states that she is doing okay.  She denies complications after cast removal.  She describes some achiness and weakness to her wrist.  She does have some stiffness with range of motion.  Denies swelling.  Denies numbness or tingling.      No Known Allergies     Social History     Socioeconomic History    Marital status:    Tobacco Use    Smoking status: Never    Smokeless tobacco: Never   Vaping Use    Vaping status: Never Used   Substance and Sexual Activity    Alcohol use: Yes     Comment: rarely    Drug use: Never    Sexual activity: Never        I reviewed the patient's chief complaint, history of present illness, review of systems, past medical history, surgical history, family history, social history, medications, and allergy list.     REVIEW OF SYSTEMS    Constitutional: Denies fevers, chills, weight loss  Cardiovascular: Denies chest pain, shortness of breath  Skin: Denies rashes, acute skin changes  Neurologic: Denies headache, loss of consciousness  MSK: Left wrist pain      Objective   Vital Signs:   /82   Pulse 76   Ht 162.6 cm (64\")   Wt 76.5 kg (168 lb 9.6 oz)   SpO2 98%   BMI 28.94 kg/m²     Body mass index is 28.94 kg/m².    Physical Exam    General: Alert. No acute distress.   Left upper extremity: Short arm cast removed today without complications.  No wounds from cast wear.  Skin dry, intact.  Forearm soft.  Nontender to palpation of the distal radius or distal ulna.  Demonstrates active wrist range of motion with associated stiffness.  Wrist flexion 30.  Wrist extension 30.  Full finger extension.  Full finger flexion.  Able to make a tight fist.  Thumb opposition " intact.  Palmar abduction of the thumb intact.  Sensation intact to median, radial, and ulnar nerve distributions.  Palpable radial pulse.    Procedures    Imaging Results (Most Recent)       Procedure Component Value Units Date/Time    XR Wrist 2 View Left [[id number]] Resulted: 02/20/25 1050     Updated: 02/20/25 1050    Narrative:      Indications: Follow-up left wrist fracture    Views: AP and lateral left wrist    Findings: Left distal radius impaction fracture is seen and stable.    Callus formation is seen at the fracture site.  All joints appear reduced.    Comparative Data: Comparative data found and reviewed today.                   Assessment and Plan    Diagnoses and all orders for this visit:    1. Closed fracture of left wrist with routine healing, subsequent encounter (Primary)    2. Left wrist pain  -     XR Wrist 2 View Left        Carole Mckeon presents today to follow-up with her left wrist fracture that we are treating conservatively.  X-rays reviewed with the patient today.  Short arm cast removed today without complications.  No wounds from cast wear.  Patient was placed in a comfort form wrist brace.  She is instructed wear wrist brace at all times, removing it for range of motion and hygiene.  Range of motion exercises were demonstrated the office today.  We will order formal physical therapy at next visit if needed.  Use ice elevation if needed for acclamation.  Work note written today with restrictions.      Patient will follow up in 3 weeks for reevaluation.  We will obtain new x-rays of the left wrist at next visit.      Call or return if symptoms worsen or patient has any concerns.       Follow Up   Return in about 3 weeks (around 3/13/2025).  Patient was given instructions and counseling regarding her condition or for health maintenance advice. Please see specific information pulled into the AVS if appropriate.     Jaimie Huffman PA-C  02/20/25  10:53 EST

## 2025-02-24 ENCOUNTER — TELEPHONE (OUTPATIENT)
Dept: ORTHOPEDIC SURGERY | Facility: CLINIC | Age: 65
End: 2025-02-24

## 2025-02-24 NOTE — TELEPHONE ENCOUNTER
The [facility] received a fax that requires your attention. The document has been indexed to the patient’s chart for your review.      Reason for sending: RECEIVED RECORDS REQUEST FROM SERVICE DATE 2/20/2025    Documents Description: RECORDS REQUEST-DEMAR[facility] INS-2/21/2025    Name of Sender: ABDELRAHMAN RIVERA    Date Indexed: 2/24/2025

## 2025-03-13 ENCOUNTER — OFFICE VISIT (OUTPATIENT)
Dept: ORTHOPEDIC SURGERY | Facility: CLINIC | Age: 65
End: 2025-03-13
Payer: OTHER MISCELLANEOUS

## 2025-03-13 VITALS — DIASTOLIC BLOOD PRESSURE: 85 MMHG | OXYGEN SATURATION: 96 % | SYSTOLIC BLOOD PRESSURE: 120 MMHG | HEART RATE: 81 BPM

## 2025-03-13 DIAGNOSIS — S62.102D CLOSED FRACTURE OF LEFT WRIST WITH ROUTINE HEALING, SUBSEQUENT ENCOUNTER: Primary | ICD-10-CM

## 2025-03-13 DIAGNOSIS — M25.532 LEFT WRIST PAIN: ICD-10-CM

## 2025-03-13 NOTE — PROGRESS NOTES
Chief Complaint  Follow-up of the Left Wrist    Subjective          Carole Mckeno presents to [facility]   History of Present Illness    Carole Mckeon presents today for a follow-up of her left wrist.  Patient has a left wrist fracture that we are treating conservatively.  Today, patient states her wrist is doing better.  She reports some achiness to her wrist.  States that she has been wearing her wrist brace.  She reports some stiffness in her range of motion, specifically extension.  Notices that her  is improving.  She reports good and full finger range of motion.      No Known Allergies     Social History     Socioeconomic History    Marital status:    Tobacco Use    Smoking status: Never    Smokeless tobacco: Never   Vaping Use    Vaping status: Never Used   Substance and Sexual Activity    Alcohol use: Yes     Comment: rarely    Drug use: Never    Sexual activity: Never        I reviewed the patient's chief complaint, history of present illness, review of systems, past medical history, surgical history, family history, social history, medications, and allergy list.     REVIEW OF SYSTEMS    Constitutional: Denies fevers, chills, weight loss  Cardiovascular: Denies chest pain, shortness of breath  Skin: Denies rashes, acute skin changes  Neurologic: Denies headache, loss of consciousness  MSK: Left wrist pain      Objective   Vital Signs:   /85   Pulse 81   SpO2 96%     There is no height or weight on file to calculate BMI.    Physical Exam    General: Alert. No acute distress.   Left upper extremity: Nontender palpation of distal radius or distal ulna.  Forearm soft.  Demonstrates active wrist range of motion.  Wrist flexion 70.  Wrist extension 15.  Stiffness with radial and ulnar deviation.  Full finger extension.  Full finger flexion.  Able to make a tight fist.  Thumb opposition intact.  Palmar adduction of thumb intact.  Sensation intact to median, radial, and  ulnar nerve distributions.  Palpable radial pulse.    Procedures    Imaging Results (Most Recent)       Procedure Component Value Units Date/Time    XR Wrist 2 View Left [[id number]] Resulted: 03/13/25 1329     Updated: 03/13/25 1329    Narrative:      Indications: Follow-up left wrist fracture    Views: AP and lateral left wrist    Findings: Left distal radius impaction fracture is seen.  Fracture is   stable and well-healing.  Callus formation is present at the fracture   site.  All joints appear reduced.    Comparative Data: Comparative data found and reviewed today.                   Assessment and Plan    Diagnoses and all orders for this visit:    1. Closed fracture of left wrist with routine healing, subsequent encounter (Primary)    2. Left wrist pain  -     XR Wrist 2 View Left        Carole Mckeon presents today to follow-up with her left wrist fracture that we are treating conservatively.  X-rays reviewed with the patient today.  She is instructed to continue with home exercises working on range of motion and gradual strength as tolerated.  We discussed wearing a wrist brace while at work and with activities.  Continue to avoid any heavy lifting, pushing or pulling with the left upper extremity.  Use ice and elevation if needed for inflammation.      Patient will follow up in 4 weeks for reevaluation.  We will obtain new x-rays of the left wrist at next visit.      Call or return if symptoms worsen or patient has any concerns.       Follow Up   Return in about 4 weeks (around 4/10/2025).  Patient was given instructions and counseling regarding her condition or for health maintenance advice. Please see specific information pulled into the AVS if appropriate.     Jaimie Huffman PA-C  03/13/25  13:33 EDT

## 2025-03-19 ENCOUNTER — TELEPHONE (OUTPATIENT)
Dept: ORTHOPEDIC SURGERY | Facility: CLINIC | Age: 65
End: 2025-03-19

## 2025-03-19 NOTE — TELEPHONE ENCOUNTER
[facility] received a fax that requires your attention. The document has been indexed to the patient’s chart for your review.      Reason for sending: RECORD REQUEST    Documents Description: RECORD [id number]    Name of Sender: ROBERTO    Date Indexed: 3.17.25    Notes (if needed): PLEASE FORWARD MEDICAL RECORDS FOR DOS 3.12.25

## 2025-04-10 ENCOUNTER — OFFICE VISIT (OUTPATIENT)
Dept: ORTHOPEDIC SURGERY | Facility: CLINIC | Age: 65
End: 2025-04-10
Payer: OTHER MISCELLANEOUS

## 2025-04-10 VITALS — DIASTOLIC BLOOD PRESSURE: 83 MMHG | HEART RATE: 94 BPM | SYSTOLIC BLOOD PRESSURE: 134 MMHG | OXYGEN SATURATION: 93 %

## 2025-04-10 DIAGNOSIS — S62.102D CLOSED FRACTURE OF LEFT WRIST WITH ROUTINE HEALING, SUBSEQUENT ENCOUNTER: Primary | ICD-10-CM

## 2025-04-10 DIAGNOSIS — M25.532 LEFT WRIST PAIN: ICD-10-CM

## 2025-04-10 NOTE — PROGRESS NOTES
Chief Complaint  Follow-up of the Left Wrist    Subjective          Carole Mckeon presents to [facility]   History of Present Illness    Carole Mckeon presents today for a follow-up of her left wrist.  Patient has a left wrist fracture that we are treating conservatively.  Today, patient states that she is doing well.  She reports improvements in her range of motion.  She has been doing her home exercises.  States that she has no pain at rest but achiness on occasion.  She has been wearing her wrist brace only as needed.  She has noticed improvements in her strength as well as her  strength.  Denies numbness or tingling to the hand.      No Known Allergies     Social History     Socioeconomic History    Marital status:    Tobacco Use    Smoking status: Never    Smokeless tobacco: Never   Vaping Use    Vaping status: Never Used   Substance and Sexual Activity    Alcohol use: Yes     Comment: rarely    Drug use: Never    Sexual activity: Never        I reviewed the patient's chief complaint, history of present illness, review of systems, past medical history, surgical history, family history, social history, medications, and allergy list.     REVIEW OF SYSTEMS    Constitutional: Denies fevers, chills, weight loss  Cardiovascular: Denies chest pain, shortness of breath  Skin: Denies rashes, acute skin changes  Neurologic: Denies headache, loss of consciousness  MSK: Left wrist pain      Objective   Vital Signs:   /83   Pulse 94   SpO2 93%     There is no height or weight on file to calculate BMI.    Physical Exam    General: Alert. No acute distress.   Left upper extremity: Forearm soft.  Demonstrates active wrist range of motion.  Wrist flexion 60.  Wrist extension 30.  No pain with radial or ulnar deviation.  90 pronation.  70 supination.  Full finger extension.  Full finger flexion.  Able to make a tight fist.  Thumb opposition intact.  Palmar abduction of the thumb  intact.  Sensation intact to median, radial, and ulnar nerve distributions.  Palpable radial pulse.    Procedures    Imaging Results (Most Recent)       Procedure Component Value Units Date/Time    XR Wrist 2 View Left [[id number]] Resulted: 04/10/25 1406     Updated: 04/10/25 1406    Narrative:      Indications: Follow-up left wrist fracture    Views: AP and lateral left wrist    Findings: Impaction fracture to left distal radius is seen and stable.    Callus is present at the fracture site.  All joints appear reduced.    Comparative Data: Comparative data found and reviewed today.                   Assessment and Plan    Diagnoses and all orders for this visit:    1. Closed fracture of left wrist with routine healing, subsequent encounter (Primary)    2. Left wrist pain  -     XR Wrist 2 View Left        Carole Mckeon presents today to follow-up on her left wrist fracture that we are treating conservatively.  X-rays reviewed with the patient today.  Patient instructed to continue with home exercises for range of motion and gradual strength as tolerated.  Continue gradually progressing with activities as tolerated.  Work note written today.      Patient will follow up as needed.    Call or return if symptoms worsen or patient has any concerns.       Follow Up   Return if symptoms worsen or fail to improve.  Patient was given instructions and counseling regarding her condition or for health maintenance advice. Please see specific information pulled into the AVS if appropriate.     Jaimie Huffman PA-C  04/10/25  14:08 EDT

## (undated) DEVICE — FRCP BX RADJAW4 NDL 2.8 240CM LG OG BX40

## (undated) DEVICE — ADAPT CLN BIOGUARD AIR/H2O DISP

## (undated) DEVICE — CANN O2 ETCO2 FITS ALL CONN CO2 SMPL A/ 7IN DISP LF

## (undated) DEVICE — SENSR O2 OXIMAX FNGR A/ 18IN NONSTR

## (undated) DEVICE — KT ORCA ORCAPOD DISP STRL

## (undated) DEVICE — BITEBLOCK OMNI BLOC

## (undated) DEVICE — TUBING, SUCTION, 1/4" X 10', STRAIGHT: Brand: MEDLINE

## (undated) DEVICE — LN SMPL CO2 SHTRM SD STREAM W/M LUER